# Patient Record
Sex: FEMALE | Race: WHITE | Employment: OTHER | URBAN - METROPOLITAN AREA
[De-identification: names, ages, dates, MRNs, and addresses within clinical notes are randomized per-mention and may not be internally consistent; named-entity substitution may affect disease eponyms.]

---

## 2017-01-16 RX ORDER — ASPIRIN 325 MG
325 TABLET ORAL DAILY
Qty: 30 TABLET | Refills: 5 | Status: SHIPPED | OUTPATIENT
Start: 2017-01-16 | End: 2018-03-02

## 2017-03-21 RX ORDER — ATORVASTATIN CALCIUM 40 MG/1
TABLET, FILM COATED ORAL
Qty: 30 TABLET | Refills: 1 | Status: SHIPPED | OUTPATIENT
Start: 2017-03-21 | End: 2017-06-06 | Stop reason: SDUPTHER

## 2017-04-03 RX ORDER — LOSARTAN POTASSIUM 100 MG/1
TABLET ORAL
Qty: 30 TABLET | Refills: 2 | Status: SHIPPED | OUTPATIENT
Start: 2017-04-03 | End: 2018-02-02 | Stop reason: SDUPTHER

## 2017-04-04 RX ORDER — LOSARTAN POTASSIUM 100 MG/1
TABLET ORAL
Qty: 30 TABLET | Refills: 1 | Status: SHIPPED | OUTPATIENT
Start: 2017-04-04 | End: 2017-08-16 | Stop reason: SDUPTHER

## 2017-06-06 RX ORDER — ATORVASTATIN CALCIUM 40 MG/1
TABLET, FILM COATED ORAL
Qty: 30 TABLET | Refills: 0 | Status: SHIPPED | OUTPATIENT
Start: 2017-06-06 | End: 2017-06-28 | Stop reason: SDUPTHER

## 2017-06-06 RX ORDER — CARVEDILOL 25 MG/1
TABLET ORAL
Qty: 60 TABLET | Refills: 3 | Status: SHIPPED | OUTPATIENT
Start: 2017-06-06 | End: 2018-02-02 | Stop reason: SDUPTHER

## 2017-06-16 ENCOUNTER — TELEPHONE (OUTPATIENT)
Dept: FAMILY MEDICINE CLINIC | Age: 82
End: 2017-06-16

## 2017-06-16 RX ORDER — TRIAMTERENE AND HYDROCHLOROTHIAZIDE 37.5; 25 MG/1; MG/1
TABLET ORAL
Qty: 30 TABLET | Refills: 3 | Status: SHIPPED | OUTPATIENT
Start: 2017-06-16 | End: 2017-08-16 | Stop reason: SDUPTHER

## 2017-06-16 RX ORDER — CARVEDILOL 25 MG/1
TABLET ORAL
Qty: 60 TABLET | Refills: 3 | Status: SHIPPED | OUTPATIENT
Start: 2017-06-16 | End: 2017-08-16 | Stop reason: SDUPTHER

## 2017-06-28 RX ORDER — ATORVASTATIN CALCIUM 40 MG/1
TABLET, FILM COATED ORAL
Qty: 30 TABLET | Refills: 0 | Status: SHIPPED | OUTPATIENT
Start: 2017-06-28 | End: 2017-09-09 | Stop reason: SDUPTHER

## 2017-08-16 ENCOUNTER — OFFICE VISIT (OUTPATIENT)
Dept: FAMILY MEDICINE CLINIC | Age: 82
End: 2017-08-16

## 2017-08-16 VITALS
SYSTOLIC BLOOD PRESSURE: 132 MMHG | HEIGHT: 65 IN | DIASTOLIC BLOOD PRESSURE: 74 MMHG | WEIGHT: 177 LBS | TEMPERATURE: 97.7 F | BODY MASS INDEX: 29.49 KG/M2

## 2017-08-16 DIAGNOSIS — S41.152A DOG BITE OF LEFT UPPER EXTREMITY, INITIAL ENCOUNTER: ICD-10-CM
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
## 2017-08-16 PROCEDURE — 1036F TOBACCO NON-USER: CPT | Performed by: INTERNAL MEDICINE

## 2017-08-16 PROCEDURE — 90715 TDAP VACCINE 7 YRS/> IM: CPT | Performed by: INTERNAL MEDICINE

## 2017-08-16 PROCEDURE — 4040F PNEUMOC VAC/ADMIN/RCVD: CPT | Performed by: INTERNAL MEDICINE

## 2017-08-16 PROCEDURE — 99213 OFFICE O/P EST LOW 20 MIN: CPT | Performed by: INTERNAL MEDICINE

## 2017-08-16 PROCEDURE — 1090F PRES/ABSN URINE INCON ASSESS: CPT | Performed by: INTERNAL MEDICINE

## 2017-08-16 PROCEDURE — G8419 CALC BMI OUT NRM PARAM NOF/U: HCPCS | Performed by: INTERNAL MEDICINE

## 2017-08-16 PROCEDURE — G8427 DOCREV CUR MEDS BY ELIG CLIN: HCPCS | Performed by: INTERNAL MEDICINE

## 2017-08-16 PROCEDURE — 1123F ACP DISCUSS/DSCN MKR DOCD: CPT | Performed by: INTERNAL MEDICINE

## 2017-08-16 PROCEDURE — G8400 PT W/DXA NO RESULTS DOC: HCPCS | Performed by: INTERNAL MEDICINE

## 2017-08-16 PROCEDURE — G8598 ASA/ANTIPLAT THER USED: HCPCS | Performed by: INTERNAL MEDICINE

## 2017-08-16 PROCEDURE — 90471 IMMUNIZATION ADMIN: CPT | Performed by: INTERNAL MEDICINE

## 2017-08-16 RX ORDER — AMOXICILLIN AND CLAVULANATE POTASSIUM 875; 125 MG/1; MG/1
1 TABLET, FILM COATED ORAL 2 TIMES DAILY
Qty: 20 TABLET | Refills: 0 | Status: SHIPPED | OUTPATIENT
Start: 2017-08-16 | End: 2017-08-26

## 2017-08-16 ASSESSMENT — ENCOUNTER SYMPTOMS
APNEA: 0
ABDOMINAL PAIN: 0
SHORTNESS OF BREATH: 0
STRIDOR: 0

## 2017-09-11 RX ORDER — ATORVASTATIN CALCIUM 40 MG/1
TABLET, FILM COATED ORAL
Qty: 30 TABLET | Refills: 0 | Status: ON HOLD | OUTPATIENT
Start: 2017-09-11 | End: 2018-07-26 | Stop reason: ALTCHOICE

## 2017-10-17 RX ORDER — TRIAMTERENE AND HYDROCHLOROTHIAZIDE 37.5; 25 MG/1; MG/1
TABLET ORAL
Qty: 30 TABLET | Refills: 3 | Status: SHIPPED | OUTPATIENT
Start: 2017-10-17 | End: 2017-10-31 | Stop reason: SDUPTHER

## 2017-10-17 RX ORDER — ATORVASTATIN CALCIUM 40 MG/1
TABLET, FILM COATED ORAL
Qty: 30 TABLET | Refills: 0 | Status: SHIPPED | OUTPATIENT
Start: 2017-10-17 | End: 2017-10-31 | Stop reason: SDUPTHER

## 2017-10-30 ENCOUNTER — CARE COORDINATION (OUTPATIENT)
Dept: CARE COORDINATION | Age: 82
End: 2017-10-30

## 2017-10-30 NOTE — CARE COORDINATION
Ambulatory Care Coordination  ED Follow up Call    Reason for ED visit:  Leg Pain/ Varicose Veins   Status:     not changed    Did you call your PCP prior to going to the ED? No      Did you receive a discharge instructions from the Emergency Room? Yes  Review of Instructions:     Understands what to report/when to return?:  No- per pt she doesn't understand \"all the medical talk\"   Understands discharge instructions?:  No- (see above comment)   Following discharge instructions?:  Yes- my children are helping me as much as they can    Are there any new complaints of pain? No- pain in legs hasn't stopped  New Pain Meds? No    Constipation prophylaxis needed? N/A    If you have a wound is the dressing clean, dry, and intact? N/A  Understands wound care regimen? N/A    Are there any other complaints/concerns that you wish to tell your provider? Pt stated she is not doing well and the pain is still there    FU appts/Provider:    Future Appointments  Date Time Provider Tenisha Ortiz   10/31/2017 11:30 AM Hugo GOMEZ MMA     (Assisted with scheduling above appt with PCP- due to pt stating she was just going to go to hospitals to be seen without appt)    New Medications?:   No      Medication Reconciliation by phone - Yes  Understands Medications? No  Taking Medications? Yes  Can you swallow your pills? No    Any further needs in the home i.e. Equipment? No    Link to services in community?:  No   Which services: Will mail [employer] Brochure for pt/pt's family to review. Pt noted to have difficulty with memory during conversation. Pt has no Dx Hx of cognitive impairment. Per notes it states pt needs constant redirection (ER note from 10/23).      Tamika MOSS, RN Care Coordinator  [address] [place] [address],  [address] [place] [address] Primary Care  [phone]

## 2017-10-30 NOTE — LETTER
10/30/2017    Barbara Delacruz  [address] [facility] [address]    I am following up on my previous contact. I wanted to introduce myself and the care coordination program offered here at [facility] DO CHRISTAL's office. Our goal is to support you in your efforts to be as healthy as possible. [address] [place] [address] is committed to walk with you on your journey to better health. Please let me know if you have any questions or if you would like to schedule an appointment with me. You can reach me at the numbers listed below. I am enclosing a [place] [employer] Brochure for you and your family to review for possible community resources. If you have any questions regarding their services and your eligibility please call them directly. I look forward to speaking with you in the next 2 weeks.      In good health,     Analia AUSTINN, RN Care Coordinator  [address] [place] [address],  [address] [place] [address] Primary Care  [phone]

## 2017-10-31 ENCOUNTER — OFFICE VISIT (OUTPATIENT)
Dept: FAMILY MEDICINE CLINIC | Age: 82
End: 2017-10-31

## 2017-10-31 VITALS
BODY MASS INDEX: 27.66 KG/M2 | TEMPERATURE: 98.2 F | WEIGHT: 166 LBS | HEIGHT: 65 IN | DIASTOLIC BLOOD PRESSURE: 70 MMHG | SYSTOLIC BLOOD PRESSURE: 130 MMHG

## 2017-10-31 DIAGNOSIS — I10 ESSENTIAL HYPERTENSION: Primary | ICD-10-CM

## 2017-10-31 DIAGNOSIS — E78.00 HYPERCHOLESTEREMIA: ICD-10-CM

## 2017-10-31 DIAGNOSIS — K59.1 FUNCTIONAL DIARRHEA: ICD-10-CM

## 2017-10-31 DIAGNOSIS — E03.9 ACQUIRED HYPOTHYROIDISM: ICD-10-CM

## 2017-10-31 DIAGNOSIS — I80.02 PHLEBITIS AND THOMBOPHLB OF SUPERFIC VESSELS OF L LOW EXTREM: ICD-10-CM

## 2017-10-31 PROCEDURE — G8484 FLU IMMUNIZE NO ADMIN: HCPCS | Performed by: INTERNAL MEDICINE

## 2017-10-31 PROCEDURE — G8598 ASA/ANTIPLAT THER USED: HCPCS | Performed by: INTERNAL MEDICINE

## 2017-10-31 PROCEDURE — 1090F PRES/ABSN URINE INCON ASSESS: CPT | Performed by: INTERNAL MEDICINE

## 2017-10-31 PROCEDURE — 4040F PNEUMOC VAC/ADMIN/RCVD: CPT | Performed by: INTERNAL MEDICINE

## 2017-10-31 PROCEDURE — G8427 DOCREV CUR MEDS BY ELIG CLIN: HCPCS | Performed by: INTERNAL MEDICINE

## 2017-10-31 PROCEDURE — 99214 OFFICE O/P EST MOD 30 MIN: CPT | Performed by: INTERNAL MEDICINE

## 2017-10-31 PROCEDURE — 1123F ACP DISCUSS/DSCN MKR DOCD: CPT | Performed by: INTERNAL MEDICINE

## 2017-10-31 PROCEDURE — G8400 PT W/DXA NO RESULTS DOC: HCPCS | Performed by: INTERNAL MEDICINE

## 2017-10-31 PROCEDURE — 1036F TOBACCO NON-USER: CPT | Performed by: INTERNAL MEDICINE

## 2017-10-31 PROCEDURE — G8419 CALC BMI OUT NRM PARAM NOF/U: HCPCS | Performed by: INTERNAL MEDICINE

## 2017-10-31 ASSESSMENT — ENCOUNTER SYMPTOMS
COUGH: 0
DIARRHEA: 1
WHEEZING: 0
SINUS PAIN: 0
SINUS PRESSURE: 0
RHINORRHEA: 0
APNEA: 0
SORE THROAT: 0
SHORTNESS OF BREATH: 0

## 2017-10-31 NOTE — PATIENT INSTRUCTIONS
Thank you for choosing [facility]. Please bring a current list of medications to every appointment. Please contact your pharmacy for any prescription refill(s) that you are requesting.

## 2017-10-31 NOTE — PROGRESS NOTES
diarrhea  Garett Collier MD (ROBBY)   3. Phlebitis and thombophlb of superfic vessels of l low extrem     4. Hypercholesteremia  Lipid Panel    AST    ALT   5. Acquired hypothyroidism  TSH without Reflex         Plan:      Outpatient Encounter Prescriptions as of 10/31/2017   Medication Sig Dispense Refill    hydrocortisone-pramoxine (PROCTOFOAM HC) 1-1 % rectal foam Place rectally 2 times daily. 1 Can 0    HYDROcodone-acetaminophen (NORCO) 7.5-325 MG per tablet Take 0.5 tablets by mouth every 6 hours as needed for Pain . 10 tablet 0    atorvastatin (LIPITOR) 40 MG tablet TAKE ONE TABLET BY MOUTH EVERY EVENING 30 tablet 0    carvedilol (COREG) 25 MG tablet TAKE ONE TABLET BY MOUTH TWICE A DAY 60 tablet 3    losartan (COZAAR) 100 MG tablet TAKE ONE TABLET BY MOUTH DAILY 30 tablet 2    aspirin (CASI ASPIRIN) 325 MG tablet Take 1 tablet by mouth daily 30 tablet 5    levothyroxine (SYNTHROID) 50 MCG tablet TAKE ONE TABLET BY MOUTH DAILY 90 tablet 0    triamterene-hydrochlorothiazide (MAXZIDE-25) 37.5-25 MG per tablet TAKE ONE TABLET BY MOUTH DAILY 30 tablet 4    NEXIUM 40 MG capsule TAKE ONE CAPSULE BY MOUTH DAILY BEFORE BREAKFAST 90 capsule 2    diclofenac sodium (VOLTAREN) 1 % GEL APPLY 2 GRAMS APPLY TO AFFECTED AREA(S) TWO TIMES A DAY 1 Tube 12    [DISCONTINUED] atorvastatin (LIPITOR) 40 MG tablet TAKE ONE TABLET BY MOUTH EVERY EVENING 30 tablet 0    [DISCONTINUED] triamterene-hydrochlorothiazide (MAXZIDE-25) 37.5-25 MG per tablet TAKE ONE TABLET BY MOUTH DAILY 30 tablet 3    polyethylene glycol (MIRALAX) powder Take 17 g by mouth daily. No facility-administered encounter medications on file as of 10/31/2017.       Orders Placed This Encounter   Procedures    Lipid Panel     Standing Status:   Future     Standing Expiration Date:   10/31/2018     Order Specific Question:   Is Patient Fasting?/# of Hours     Answer:   12    AST     Standing Status:   Future Standing Expiration Date:   10/31/2018    ALT     Standing Status:   Future     Standing Expiration Date:   10/31/2018    Basic Metabolic Panel     Standing Status:   Future     Standing Expiration Date:   10/31/2018    TSH without Reflex     Standing Status:   Future     Standing Expiration Date:   10/31/2018   Loly Brasher MD ([facility])     Referral Priority:   Routine     Referral Type:   Consult for Advice and Opinion     Referral Reason:   Specialty Services Required     Referred to Provider:   Jaleesa Torres MD     Requested Specialty:   Gastroenterology     Number of Visits Requested:   1    refer to gi

## 2017-11-01 ENCOUNTER — TELEPHONE (OUTPATIENT)
Dept: FAMILY MEDICINE CLINIC | Age: 82
End: 2017-11-01

## 2017-11-07 ENCOUNTER — CARE COORDINATION (OUTPATIENT)
Dept: CARE COORDINATION | Age: 82
End: 2017-11-07

## 2017-11-14 NOTE — CARE COORDINATION
Third outreach call attempted. Pt declined stated she has no needs people can help her with. No further needs at this time.     José Miguel Amezcua BSN, RN Care Coordinator  [address] [place] [address] Primary Care  [phone]

## 2017-12-06 ENCOUNTER — HOSPITAL ENCOUNTER (OUTPATIENT)
Dept: NON INVASIVE DIAGNOSTICS | Age: 82
Discharge: HOME | End: 2017-12-06
Attending: INTERNAL MEDICINE | Admitting: INTERNAL MEDICINE

## 2017-12-06 DIAGNOSIS — K56.41 FECAL IMPACTION (HCC): ICD-10-CM

## 2017-12-07 ENCOUNTER — HOSPITAL ENCOUNTER (OUTPATIENT)
Dept: ENDOSCOPY | Age: 82
Discharge: HOME | End: 2017-12-07
Attending: INTERNAL MEDICINE | Admitting: INTERNAL MEDICINE

## 2017-12-07 VITALS
BODY MASS INDEX: 28.34 KG/M2 | HEIGHT: 64 IN | HEART RATE: 58 BPM | WEIGHT: 166 LBS | SYSTOLIC BLOOD PRESSURE: 176 MMHG | OXYGEN SATURATION: 100 % | TEMPERATURE: 97 F | DIASTOLIC BLOOD PRESSURE: 62 MMHG | RESPIRATION RATE: 16 BRPM

## 2017-12-07 PROCEDURE — 93010 ELECTROCARDIOGRAM REPORT: CPT | Performed by: INTERNAL MEDICINE

## 2017-12-07 RX ORDER — SODIUM CHLORIDE 0.9 % (FLUSH) 0.9 %
10 SYRINGE (ML) INJECTION EVERY 12 HOURS SCHEDULED
Status: DISCONTINUED | OUTPATIENT
Start: 2017-12-07 | End: 2017-12-08 | Stop reason: HOSPADM

## 2017-12-07 RX ORDER — SODIUM CHLORIDE 0.9 % (FLUSH) 0.9 %
10 SYRINGE (ML) INJECTION PRN
Status: DISCONTINUED | OUTPATIENT
Start: 2017-12-07 | End: 2017-12-08 | Stop reason: HOSPADM

## 2017-12-07 RX ORDER — SODIUM CHLORIDE 9 MG/ML
INJECTION, SOLUTION INTRAVENOUS CONTINUOUS
Status: DISCONTINUED | OUTPATIENT
Start: 2017-12-07 | End: 2017-12-08 | Stop reason: HOSPADM

## 2017-12-07 RX ADMIN — SODIUM CHLORIDE: 9 INJECTION, SOLUTION INTRAVENOUS at 12:05

## 2017-12-07 ASSESSMENT — LIFESTYLE VARIABLES: SMOKING_STATUS: 0

## 2017-12-07 ASSESSMENT — PAIN - FUNCTIONAL ASSESSMENT: PAIN_FUNCTIONAL_ASSESSMENT: FLACC

## 2017-12-07 ASSESSMENT — PAIN SCALES - GENERAL
PAINLEVEL_OUTOF10: 0
PAINLEVEL_OUTOF10: 0

## 2017-12-07 ASSESSMENT — ENCOUNTER SYMPTOMS: SHORTNESS OF BREATH: 0

## 2017-12-07 NOTE — H&P
[facility] [place]   Pre-operative History and Physical    Patient: Nina Morales  : 1935  Acct#:     HISTORY OF PRESENT ILLNESS:    The patient is a 81 y.o. female who presents with fecal impaction. Too uncomfortable to disimpact in the office. Plan is for flex sig with disimpaction. Past Medical History:        Diagnosis Date    GERD (gastroesophageal reflux disease)     Hyperlipidemia     Hypertension     Hypothyroidism     Osteoarthritis       Past Surgical History:        Procedure Laterality Date    CATARACT REMOVAL  2008    right    CHOLECYSTECTOMY      COLONOSCOPY  2011    Dr. Leon Gordon , severe sigmoid diverticulosis, friable internal hemorrhoids, scarred anal sphincter    HYSTERECTOMY      JOINT REPLACEMENT      [place] TOTAL KNEE ARTHROPLASTY  2011    DR. Bettencourt -right Knee Replacement, Total    JASMEET AND BSO      TOTAL HIP ARTHROPLASTY      left/right    TOTAL KNEE ARTHROPLASTY      left    TOTAL KNEE ARTHROPLASTY      right    UPPER GASTROINTESTINAL ENDOSCOPY  02  -Dr. Springer    small sliding hiatal hernia/gastritis      Medications Prior to Admission:   Current Outpatient Prescriptions on File Prior to Encounter   Medication Sig Dispense Refill    carvedilol (COREG) 25 MG tablet TAKE ONE TABLET BY MOUTH TWICE A DAY 60 tablet 3    hydrocortisone-pramoxine (PROCTOFOAM HC) 1-1 % rectal foam Place rectally 2 times daily.  1 Can 0    atorvastatin (LIPITOR) 40 MG tablet TAKE ONE TABLET BY MOUTH EVERY EVENING 30 tablet 0    losartan (COZAAR) 100 MG tablet TAKE ONE TABLET BY MOUTH DAILY 30 tablet 2    aspirin (CASI ASPIRIN) 325 MG tablet Take 1 tablet by mouth daily 30 tablet 5    levothyroxine (SYNTHROID) 50 MCG tablet TAKE ONE TABLET BY MOUTH DAILY 90 tablet 0    triamterene-hydrochlorothiazide (MAXZIDE-25) 37.5-25 MG per tablet TAKE ONE TABLET BY MOUTH DAILY 30 tablet 4    NEXIUM 40 MG capsule TAKE ONE CAPSULE BY MOUTH DAILY

## 2017-12-07 NOTE — PROGRESS NOTES
1. Identify name and date of birth. 2.  Patient remains free from signs and symptoms of injury. 3.  Patient receives appropriate medication(s), safely administered during the       Perioperative period. 4.  The patient is free from signs and symptoms of infection. 5.  The patient has wound/tissur perfusion. 6.  The patient's fluid, electrolyte, and acid-base balances are established perioperatively. 7.  The patient's pulmonary function is established preoperatively. 8.  The patient's cardiovascular status is established perioperatively. 9.  The patient/caregiver demonstrates knowledge of nutritional management related to the operative or other invasive procedure. 10. The patient/caregiver demonstrates knowledge of medication management. 11. The patient/caregiver demonstrates knowledge of pain management. 12.  The patient participates in the rehabilitation process as applicable. 13.  The patient/caregiver participates in decisions in decisions affecting his or her Perioperative plan of care. 14.  The patients care is consistent with the individualized Perioperative plan of care. 15.  The patients right to privacy is maintained. 16.  The patient is the recipient of competent and ethical care within legal standards of practice. 17.  The patient's value system, lifestyle, ethnicity, and culture are considered, respected, and incorporated int the perioperative plan of care and understands special services available. 18.  The patient demonstrates and/or reports adequate pain control throughout the perioperative period. 19. The patient's neurological status is established preoperatively. 20. The patient/caregiver demonstrates knowledge of the expected responses to the operative or invasive procedure. 21.  Patient/caregiver has reduced anxiety. Interventions - Familiarize with environment and equipment. 22.  Patient/caregiver verbalizes understanding of Phase I and Phase II process.   23.  Patient pain level is established preoperatively using age appropriate pain scale.   Electronically signed by Xavier Keith RN on 12/7/2017 at 11:32 AM

## 2017-12-07 NOTE — ANESTHESIA PRE-OP
[facility] Department of Anesthesiology  Pre-Anesthesia Evaluation/Consultation       Name:  Champ Hunter  : 1935  Age:  81 y.o. MRN:  [id number]  Date: 2017           Procedure (Scheduled):  colonoscopy  Surgeon:  Dr. John Darling Other      Plastic tape       Patient Active Problem List   Diagnosis    Hypertension    Hypercholesteremia    Hypothyroidism    Uterine prolapse    Degenerative joint disease    Varicose veins of both lower extremities    Reflux esophagitis    Personal history of lymphoma    Other specified forms of hearing loss    LBBB (left bundle branch block)    Reactive depression (situational)    Preventative health care    Syncope    CAD (coronary artery disease)    Osteopenia    Left arm pain    Pain of left heel    Sinusitis    Back pain    Acute frontal sinusitis    Memory loss    Dog bite of left upper extremity    Functional diarrhea    Phlebitis and thombophlb of superfic vessels of l low extrem     Past Medical History:   Diagnosis Date    GERD (gastroesophageal reflux disease)     Hyperlipidemia     Hypertension     Hypothyroidism     Osteoarthritis      Past Surgical History:   Procedure Laterality Date    CATARACT REMOVAL  2008    right    CHOLECYSTECTOMY      COLONOSCOPY  2011    Dr. Pam Burk , severe sigmoid diverticulosis, friable internal hemorrhoids, scarred anal sphincter    HYSTERECTOMY      JOINT REPLACEMENT      [place] TOTAL KNEE ARTHROPLASTY  2011    DR. Bettencourt -right Knee Replacement, Total    JASMEET AND BSO      TOTAL HIP ARTHROPLASTY      left/right    TOTAL KNEE ARTHROPLASTY      left    TOTAL KNEE ARTHROPLASTY  2004    right    UPPER GASTROINTESTINAL ENDOSCOPY  02  -Dr. Caryle Shields    small sliding hiatal hernia/gastritis     Social History   Substance Use Topics    Smoking status: Never Smoker    Smokeless

## 2017-12-07 NOTE — PROGRESS NOTES
Alert and oriented. abd soft. Vss. No c/o. Tolerated sitting up and po fluids well. Family at bedside. Understands discharge instructions.

## 2017-12-07 NOTE — OP NOTE
colon.  Would then use linzess to maintain good bowel movements.     Harris Gtaes MD   [address]  12/7/2017

## 2017-12-11 LAB
EKG ATRIAL RATE: 62 BPM
EKG DIAGNOSIS: NORMAL
EKG P AXIS: 48 DEGREES
EKG P-R INTERVAL: 138 MS
EKG Q-T INTERVAL: 440 MS
EKG QRS DURATION: 130 MS
EKG QTC CALCULATION (BAZETT): 446 MS
EKG R AXIS: 10 DEGREES
EKG T AXIS: 85 DEGREES
EKG VENTRICULAR RATE: 62 BPM

## 2018-01-05 RX ORDER — ATORVASTATIN CALCIUM 40 MG/1
TABLET, FILM COATED ORAL
Qty: 30 TABLET | Refills: 0 | Status: SHIPPED | OUTPATIENT
Start: 2018-01-05 | End: 2018-02-26 | Stop reason: SDUPTHER

## 2018-02-02 RX ORDER — LOSARTAN POTASSIUM 100 MG/1
TABLET ORAL
Qty: 90 TABLET | Refills: 0 | Status: SHIPPED | OUTPATIENT
Start: 2018-02-02 | End: 2018-08-17 | Stop reason: SDUPTHER

## 2018-02-02 RX ORDER — ESOMEPRAZOLE MAGNESIUM 40 MG/1
CAPSULE, DELAYED RELEASE ORAL
Qty: 90 CAPSULE | Refills: 0 | Status: SHIPPED | OUTPATIENT
Start: 2018-02-02 | End: 2018-05-03 | Stop reason: SDUPTHER

## 2018-02-02 RX ORDER — CARVEDILOL 25 MG/1
TABLET ORAL
Qty: 60 TABLET | Refills: 2 | Status: SHIPPED | OUTPATIENT
Start: 2018-02-02 | End: 2018-08-06 | Stop reason: SDUPTHER

## 2018-02-26 RX ORDER — ATORVASTATIN CALCIUM 40 MG/1
TABLET, FILM COATED ORAL
Qty: 30 TABLET | Refills: 3 | Status: SHIPPED | OUTPATIENT
Start: 2018-02-26 | End: 2018-03-02 | Stop reason: SDUPTHER

## 2018-03-02 ENCOUNTER — OFFICE VISIT (OUTPATIENT)
Dept: FAMILY MEDICINE CLINIC | Age: 83
End: 2018-03-02

## 2018-03-02 VITALS
HEIGHT: 64 IN | BODY MASS INDEX: 30.29 KG/M2 | SYSTOLIC BLOOD PRESSURE: 128 MMHG | DIASTOLIC BLOOD PRESSURE: 78 MMHG | WEIGHT: 177.4 LBS

## 2018-03-02 DIAGNOSIS — I10 ESSENTIAL HYPERTENSION: ICD-10-CM

## 2018-03-02 DIAGNOSIS — E78.00 HYPERCHOLESTEREMIA: ICD-10-CM

## 2018-03-02 DIAGNOSIS — I87.2 VENOUS INSUFFICIENCY: ICD-10-CM

## 2018-03-02 DIAGNOSIS — E03.9 ACQUIRED HYPOTHYROIDISM: ICD-10-CM

## 2018-03-02 DIAGNOSIS — E03.9 ACQUIRED HYPOTHYROIDISM: Primary | ICD-10-CM

## 2018-03-02 LAB
ALT SERPL-CCNC: 15 U/L (ref 10–40)
ANION GAP SERPL CALCULATED.3IONS-SCNC: 11 MMOL/L (ref 3–16)
AST SERPL-CCNC: 16 U/L (ref 15–37)
BUN BLDV-MCNC: 12 MG/DL (ref 7–20)
CALCIUM SERPL-MCNC: 10.2 MG/DL (ref 8.3–10.6)
CHLORIDE BLD-SCNC: 100 MMOL/L (ref 99–110)
CHOLESTEROL, TOTAL: 209 MG/DL (ref 0–199)
CO2: 29 MMOL/L (ref 21–32)
CREAT SERPL-MCNC: 0.8 MG/DL (ref 0.6–1.2)
GFR AFRICAN AMERICAN: >60
GFR NON-AFRICAN AMERICAN: >60
GLUCOSE BLD-MCNC: 114 MG/DL (ref 70–99)
HDLC SERPL-MCNC: 45 MG/DL (ref 40–60)
LDL CHOLESTEROL CALCULATED: 122 MG/DL
POTASSIUM SERPL-SCNC: 4.7 MMOL/L (ref 3.5–5.1)
SODIUM BLD-SCNC: 140 MMOL/L (ref 136–145)
TRIGL SERPL-MCNC: 210 MG/DL (ref 0–150)
TSH SERPL DL<=0.05 MIU/L-ACNC: 9.63 UIU/ML (ref 0.27–4.2)
VLDLC SERPL CALC-MCNC: 42 MG/DL

## 2018-03-02 PROCEDURE — G8598 ASA/ANTIPLAT THER USED: HCPCS | Performed by: INTERNAL MEDICINE

## 2018-03-02 PROCEDURE — G8484 FLU IMMUNIZE NO ADMIN: HCPCS | Performed by: INTERNAL MEDICINE

## 2018-03-02 PROCEDURE — 99214 OFFICE O/P EST MOD 30 MIN: CPT | Performed by: INTERNAL MEDICINE

## 2018-03-02 PROCEDURE — 1090F PRES/ABSN URINE INCON ASSESS: CPT | Performed by: INTERNAL MEDICINE

## 2018-03-02 PROCEDURE — G8427 DOCREV CUR MEDS BY ELIG CLIN: HCPCS | Performed by: INTERNAL MEDICINE

## 2018-03-02 PROCEDURE — 1123F ACP DISCUSS/DSCN MKR DOCD: CPT | Performed by: INTERNAL MEDICINE

## 2018-03-02 PROCEDURE — G8417 CALC BMI ABV UP PARAM F/U: HCPCS | Performed by: INTERNAL MEDICINE

## 2018-03-02 PROCEDURE — 4040F PNEUMOC VAC/ADMIN/RCVD: CPT | Performed by: INTERNAL MEDICINE

## 2018-03-02 PROCEDURE — 1036F TOBACCO NON-USER: CPT | Performed by: INTERNAL MEDICINE

## 2018-03-02 PROCEDURE — G8400 PT W/DXA NO RESULTS DOC: HCPCS | Performed by: INTERNAL MEDICINE

## 2018-03-02 RX ORDER — LEVOTHYROXINE SODIUM 0.05 MG/1
TABLET ORAL
Qty: 90 TABLET | Refills: 0 | Status: SHIPPED | OUTPATIENT
Start: 2018-03-02 | End: 2018-05-03 | Stop reason: SDUPTHER

## 2018-03-02 ASSESSMENT — PATIENT HEALTH QUESTIONNAIRE - PHQ9
SUM OF ALL RESPONSES TO PHQ9 QUESTIONS 1 & 2: 0
2. FEELING DOWN, DEPRESSED OR HOPELESS: 0
SUM OF ALL RESPONSES TO PHQ QUESTIONS 1-9: 0
1. LITTLE INTEREST OR PLEASURE IN DOING THINGS: 0

## 2018-03-02 ASSESSMENT — ENCOUNTER SYMPTOMS
DIARRHEA: 0
SHORTNESS OF BREATH: 0
APNEA: 0
ABDOMINAL DISTENTION: 0
SINUS PAIN: 0
CONSTIPATION: 0
BLOOD IN STOOL: 0
ABDOMINAL PAIN: 0
NAUSEA: 0
SINUS PRESSURE: 0
RHINORRHEA: 0

## 2018-03-02 NOTE — PROGRESS NOTES
Subjective:      Patient ID: Chela Hollins is a 81 y.o. female. HPI   Chief Complaint   Patient presents with    Swelling     patient c/o left foot swelling x 2-3 days. patient denies injury.     Other     patient's family request to discuss to advise patient that she should Not drive any longer    Skin Problem     patient request \"spot\" on right leg and behind left ear     Cehla Hollins is a 81 y.o. female with the following history as recorded in [facility]:  Patient Active Problem List    Diagnosis Date Noted    Syncope 09/04/2012     Priority: High    CAD (coronary artery disease) 09/04/2012     Priority: High    Hypertension 05/17/2010     Priority: High    Hypercholesteremia 05/17/2010     Priority: High    Hypothyroidism 05/17/2010     Priority: High    Uterine prolapse 05/17/2010     Priority: Medium    Degenerative joint disease 05/17/2010     Priority: Medium    Reflux esophagitis 05/17/2010     Priority: Medium    Personal history of lymphoma 05/17/2010     Priority: Medium    Preventative health care 08/18/2012     Priority: Low    Reactive depression (situational) 05/09/2012     Priority: Low    LBBB (left bundle branch block) 03/22/2011     Priority: Low    Varicose veins of both lower extremities 05/17/2010     Priority: Low    Other specified forms of hearing loss 05/17/2010     Priority: Low    Functional diarrhea 10/31/2017    Phlebitis and thombophlb of superfic vessels of l low extrem 10/31/2017    Dog bite of left upper extremity 08/16/2017    Memory loss 09/13/2016    Acute frontal sinusitis 05/04/2016    Back pain 03/10/2015    Sinusitis 11/26/2014    Pain of left heel 10/09/2013    Left arm pain 03/14/2013    Osteopenia 10/10/2012     Current Outpatient Prescriptions   Medication Sig Dispense Refill    esomeprazole (NEXIUM) 40 MG delayed release capsule TAKE ONE CAPSULE BY MOUTH DAILY BEFORE BREAKFAST 90 capsule 0    losartan (COZAAR) 100 MG tablet TAKE ONE

## 2018-03-02 NOTE — PATIENT INSTRUCTIONS
Thank you for choosing [facility]. Please bring a current list of medications to every appointment. Please contact your pharmacy for any prescription refill(s) that you are requesting.

## 2018-03-07 ENCOUNTER — TELEPHONE (OUTPATIENT)
Dept: FAMILY MEDICINE CLINIC | Age: 83
End: 2018-03-07

## 2018-03-07 DIAGNOSIS — E03.9 ACQUIRED HYPOTHYROIDISM: Primary | ICD-10-CM

## 2018-03-07 NOTE — TELEPHONE ENCOUNTER
Jens advised and states that patient has Not been taking Synthroid as directed. Patient has resumed her Synthroid after her office visit and will have TSH recheck in 8 weeks.

## 2018-03-07 NOTE — TELEPHONE ENCOUNTER
Thyroid is low increase synthroid to . 075 mg # 30 1 po qd 3 refills recheck tsh in 8 weeks .  Other labs were ok

## 2018-05-04 RX ORDER — LEVOTHYROXINE SODIUM 0.05 MG/1
TABLET ORAL
Qty: 90 TABLET | Refills: 0 | Status: ON HOLD | OUTPATIENT
Start: 2018-05-04 | End: 2018-07-28 | Stop reason: HOSPADM

## 2018-05-04 RX ORDER — ESOMEPRAZOLE MAGNESIUM 40 MG/1
CAPSULE, DELAYED RELEASE ORAL
Qty: 90 CAPSULE | Refills: 0 | Status: SHIPPED | OUTPATIENT
Start: 2018-05-04 | End: 2019-11-13

## 2018-06-25 RX ORDER — TRIAMTERENE AND HYDROCHLOROTHIAZIDE 37.5; 25 MG/1; MG/1
TABLET ORAL
Qty: 30 TABLET | Refills: 2 | Status: SHIPPED | OUTPATIENT
Start: 2018-06-25 | End: 2018-07-25 | Stop reason: SDUPTHER

## 2018-07-12 ENCOUNTER — CARE COORDINATION (OUTPATIENT)
Dept: CASE MANAGEMENT | Age: 83
End: 2018-07-12

## 2018-07-12 NOTE — CARE COORDINATION
[facility] Transitions Initial Follow Up Call    Call within 2 business days of discharge: Yes    Patient: Carlyle Pelaez Patient : 1935   MRN: <[id number]>  Reason for Admission: There are no discharge diagnoses documented for the most recent discharge. Discharge Date: 18 RARS: Readmission Risk Score: 5     Spoke with: Attempted to contact patient for transition call. Unable to reach, unable to leave a VM. Home number D/C, mobile number not accepting calls. Will attempt to outreach at a later date. Facility: [address] [place] [address] Transitions 24 Hour Call    Care Transitions Interventions         Follow Up  No future appointments.     Branden Wang RN

## 2018-07-16 NOTE — CARE COORDINATION
[facility] Transitions Initial Follow Up Call    Call within 2 business days of discharge: Yes    Patient: Whit Dawson Patient : 1935   MRN: [id number]  Reason for Admission: There are no discharge diagnoses documented for the most recent discharge. Discharge Date: 18 RARS: Readmission Risk Score: 5         Facility: [facility] Transitions 24 Hour Call    Care Transitions Interventions       Third and final attempt to contact Pt for initial transitions call. Contact information left to alternate VM requesting call back at the earliest convenience. Home phone invalid, mobile number not accepting calls. Amy Keating RN BSN   Care Transitions Coordinator  [phone]         Follow Up  No future appointments.     Amy Keating RN

## 2018-07-17 ENCOUNTER — CARE COORDINATION (OUTPATIENT)
Dept: CASE MANAGEMENT | Age: 83
End: 2018-07-17

## 2018-07-19 NOTE — CARE COORDINATION
[facility] Transitions Follow Up Call    2018    Patient: Aj Levy  Patient : 1935   MRN: [id number]  Reason for Admission: There are no discharge diagnoses documented for the most recent discharge. Discharge Date: 18 RARS: Readmission Risk Score: 5           Care Transitions Subsequent and Final Call    Subsequent and Final Calls  Care Transitions Interventions  Other Interventions:          Attempted to contact pt's son to follow up on transitions to home. No answer, will continue to attempt to reach    Daniella Candelario, [address] [place] [address] Coordinator  [phone]     Follow Up  No future appointments.     Daniella Candelario RN

## 2018-07-23 ENCOUNTER — TELEPHONE (OUTPATIENT)
Dept: FAMILY MEDICINE CLINIC | Age: 83
End: 2018-07-23

## 2018-07-23 NOTE — TELEPHONE ENCOUNTER
I dont think that she needs hospice at this time if she is sick and weak she needs to be seen at the ed to day .  I also ordered a home health care evaluation

## 2018-07-23 NOTE — TELEPHONE ENCOUNTER
Sallie Dejesus w/ hospice called she stated the pt isn't signed on with them. We need to call the family.

## 2018-07-23 NOTE — TELEPHONE ENCOUNTER
Pt daughter calling to asking for order for [facility]  to evaluate patient  for home care. She states pt is very weak not able to walk and not eating/drinking. She is asking for order to be faxed to [address] [address]. Phone number is [phone].     Please adviseJacoby [phone]

## 2018-07-23 NOTE — TELEPHONE ENCOUNTER
Patient's sister advised and states that they (the family) are not taking the patient to the ER as directed at this time.

## 2018-07-25 ENCOUNTER — OFFICE VISIT (OUTPATIENT)
Dept: FAMILY MEDICINE CLINIC | Age: 83
End: 2018-07-25

## 2018-07-25 VITALS
DIASTOLIC BLOOD PRESSURE: 38 MMHG | BODY MASS INDEX: 24.01 KG/M2 | SYSTOLIC BLOOD PRESSURE: 60 MMHG | WEIGHT: 153 LBS | HEIGHT: 67 IN

## 2018-07-25 DIAGNOSIS — I95.9: Primary | ICD-10-CM

## 2018-07-25 DIAGNOSIS — E86.0 DEHYDRATION: ICD-10-CM

## 2018-07-25 DIAGNOSIS — R41.3 MEMORY LOSS: ICD-10-CM

## 2018-07-25 PROBLEM — R10.84 GENERALIZED ABDOMINAL PAIN: Status: ACTIVE | Noted: 2018-07-25

## 2018-07-25 PROBLEM — F03.90 DEMENTIA (HCC): Status: ACTIVE | Noted: 2018-07-25

## 2018-07-25 PROBLEM — K52.9 COLITIS: Status: ACTIVE | Noted: 2018-07-25

## 2018-07-25 PROBLEM — K59.00 CONSTIPATION: Status: ACTIVE | Noted: 2018-07-25

## 2018-07-25 PROCEDURE — 1090F PRES/ABSN URINE INCON ASSESS: CPT | Performed by: INTERNAL MEDICINE

## 2018-07-25 PROCEDURE — G8420 CALC BMI NORM PARAMETERS: HCPCS | Performed by: INTERNAL MEDICINE

## 2018-07-25 PROCEDURE — 1101F PT FALLS ASSESS-DOCD LE1/YR: CPT | Performed by: INTERNAL MEDICINE

## 2018-07-25 PROCEDURE — 1123F ACP DISCUSS/DSCN MKR DOCD: CPT | Performed by: INTERNAL MEDICINE

## 2018-07-25 PROCEDURE — 4040F PNEUMOC VAC/ADMIN/RCVD: CPT | Performed by: INTERNAL MEDICINE

## 2018-07-25 PROCEDURE — 99213 OFFICE O/P EST LOW 20 MIN: CPT | Performed by: INTERNAL MEDICINE

## 2018-07-25 PROCEDURE — G8427 DOCREV CUR MEDS BY ELIG CLIN: HCPCS | Performed by: INTERNAL MEDICINE

## 2018-07-25 PROCEDURE — 1036F TOBACCO NON-USER: CPT | Performed by: INTERNAL MEDICINE

## 2018-07-25 PROCEDURE — G8598 ASA/ANTIPLAT THER USED: HCPCS | Performed by: INTERNAL MEDICINE

## 2018-07-25 PROCEDURE — G8400 PT W/DXA NO RESULTS DOC: HCPCS | Performed by: INTERNAL MEDICINE

## 2018-07-25 ASSESSMENT — ENCOUNTER SYMPTOMS
WHEEZING: 0
COUGH: 0
RECTAL PAIN: 1
APNEA: 0
VOMITING: 0
DIARRHEA: 0
BLOOD IN STOOL: 0
ABDOMINAL PAIN: 0
RHINORRHEA: 0
SHORTNESS OF BREATH: 0

## 2018-07-25 NOTE — PROGRESS NOTES
Subjective:      Patient ID: Clive Razo is a 81 y.o. female. HPI   Chief Complaint   Patient presents with   Jerome Ohara Other     discuss decline in health. please review medication list (patient has not been taking all meds listed)   patient family were instructed tto take here to  The ED on Monday but refused . She has had increased dizziness and was not eating much  now her BP is 62/42. She has been in the ed multiple time over the past month . Has had significant issues with bowel impaction . Review of Systems   Constitutional: Positive for fatigue. Negative for chills, diaphoresis and fever. HENT: Negative for congestion, postnasal drip and rhinorrhea. Respiratory: Negative for apnea, cough, shortness of breath and wheezing. Cardiovascular: Negative for chest pain and palpitations. Gastrointestinal: Positive for rectal pain. Negative for abdominal pain, blood in stool, diarrhea and vomiting. Genitourinary: Negative for dysuria and hematuria. Musculoskeletal: Negative for arthralgias and myalgias. Skin: Negative for rash. Neurological: Positive for dizziness and light-headedness. Negative for tremors. Hematological: Does not bruise/bleed easily. Objective:   Physical Exam   Constitutional: She appears distressed. HENT:   Head: Normocephalic and atraumatic. Eyes: Conjunctivae are normal. Pupils are equal, round, and reactive to light. Neck: No thyromegaly present. Cardiovascular: Normal rate. No murmur heard. Pulmonary/Chest: Effort normal and breath sounds normal.   Abdominal: She exhibits distension. She exhibits no mass. There is tenderness. There is no rebound and no guarding. Neurological: She is alert. Assessment:        Diagnosis Orders   1. Significant hypotension     2. Dehydration     3.  Memory loss             Plan:      Send to ER

## 2018-07-29 ENCOUNTER — CARE COORDINATION (OUTPATIENT)
Dept: CASE MANAGEMENT | Age: 83
End: 2018-07-29

## 2018-07-30 NOTE — CARE COORDINATION
[facility] Transitions Initial Follow Up Call    Call within 2 business days of discharge: Yes    Patient: Gay Baumgarten Patient : 1935   MRN: [id number]  Reason for Admission: There are no discharge diagnoses documented for the most recent discharge. Discharge Date: 18 RARS: Readmission Risk Score: 16     Spoke with: Kya Love - son    Facility: [facility]    Non-face-to-face services provided:  Obtained and reviewed discharge summary and/or continuity of care documents    Care Transitions 24 Hour Call    Do you have any ongoing symptoms?:  No  Do you have a copy of your discharge instructions?:  Yes  Do you have all of your prescriptions and are they filled?:  Yes  Have you been contacted by a [address]?:  No  Have you scheduled your follow up appointment?:  No  Were you discharged with any Home Care or Post Acute Services:  Yes  Post Acute Services:  Home Health (Comment: [facility])  Do you feel like you have everything you need to keep you well at home?:  Yes  Care Transitions Interventions         Follow Up: Spoke with Dionisio Sims - son. He states his mother is doing [place] [facility] [employer]'. Dionisio Sims states she is a little better today. He states today is a good day. Dionisio Sims states they received a copy of his mother's d/c instructions and they were reviewed with them prior to d/c. Dionisio Sims states [address] per [facility] has not contacted him. Writer placed call to [facility] - they have not received the referral - they will look electronically and begin processing the referral. Dionisio Sims denies his mother having any abd pain. Dionisio Sims feels like his mother is stronger today. Writer offered to assist in scheduling hospital f/u with PCP. Dionisio Bethany accepted - writer was unable to complete this task - all appts marked Private. Encouraged myrna to call to schedule for his mother. Provided Dionisio Sims with the physician referral number. Discussed role of [facility]. Dionisio Sims is agreeable to follow up from [facility]. He has writer's contact info. No future appointments.     Miesha Gilmore Dougie, RN

## 2018-08-06 RX ORDER — CARVEDILOL 25 MG/1
TABLET ORAL
Qty: 60 TABLET | Refills: 1 | Status: SHIPPED | OUTPATIENT
Start: 2018-08-06 | End: 2018-10-08 | Stop reason: SDUPTHER

## 2018-08-08 ENCOUNTER — TELEPHONE (OUTPATIENT)
Dept: FAMILY MEDICINE CLINIC | Age: 83
End: 2018-08-08

## 2018-08-17 ENCOUNTER — OFFICE VISIT (OUTPATIENT)
Dept: FAMILY MEDICINE CLINIC | Age: 83
End: 2018-08-17

## 2018-08-17 VITALS
WEIGHT: 162.8 LBS | SYSTOLIC BLOOD PRESSURE: 118 MMHG | BODY MASS INDEX: 27.12 KG/M2 | HEIGHT: 65 IN | TEMPERATURE: 97.9 F | DIASTOLIC BLOOD PRESSURE: 68 MMHG

## 2018-08-17 DIAGNOSIS — S01.01XA LACERATION OF SCALP, INITIAL ENCOUNTER: ICD-10-CM

## 2018-08-17 PROCEDURE — G8427 DOCREV CUR MEDS BY ELIG CLIN: HCPCS | Performed by: INTERNAL MEDICINE

## 2018-08-17 PROCEDURE — 1090F PRES/ABSN URINE INCON ASSESS: CPT | Performed by: INTERNAL MEDICINE

## 2018-08-17 PROCEDURE — G8598 ASA/ANTIPLAT THER USED: HCPCS | Performed by: INTERNAL MEDICINE

## 2018-08-17 PROCEDURE — 1101F PT FALLS ASSESS-DOCD LE1/YR: CPT | Performed by: INTERNAL MEDICINE

## 2018-08-17 PROCEDURE — 99213 OFFICE O/P EST LOW 20 MIN: CPT | Performed by: INTERNAL MEDICINE

## 2018-08-17 PROCEDURE — G8417 CALC BMI ABV UP PARAM F/U: HCPCS | Performed by: INTERNAL MEDICINE

## 2018-08-17 PROCEDURE — G8400 PT W/DXA NO RESULTS DOC: HCPCS | Performed by: INTERNAL MEDICINE

## 2018-08-17 PROCEDURE — 1123F ACP DISCUSS/DSCN MKR DOCD: CPT | Performed by: INTERNAL MEDICINE

## 2018-08-17 PROCEDURE — G0180 MD CERTIFICATION HHA PATIENT: HCPCS | Performed by: INTERNAL MEDICINE

## 2018-08-17 PROCEDURE — 1111F DSCHRG MED/CURRENT MED MERGE: CPT | Performed by: INTERNAL MEDICINE

## 2018-08-17 PROCEDURE — 1036F TOBACCO NON-USER: CPT | Performed by: INTERNAL MEDICINE

## 2018-08-17 PROCEDURE — 4040F PNEUMOC VAC/ADMIN/RCVD: CPT | Performed by: INTERNAL MEDICINE

## 2018-08-17 RX ORDER — TRIAMTERENE AND HYDROCHLOROTHIAZIDE 37.5; 25 MG/1; MG/1
TABLET ORAL
Qty: 30 TABLET | Refills: 4 | Status: SHIPPED | OUTPATIENT
Start: 2018-08-17 | End: 2019-04-08 | Stop reason: SDUPTHER

## 2018-08-17 RX ORDER — LEVOTHYROXINE SODIUM 0.07 MG/1
75 TABLET ORAL
Qty: 30 TABLET | Refills: 1 | Status: SHIPPED | OUTPATIENT
Start: 2018-08-17 | End: 2018-11-06 | Stop reason: SDUPTHER

## 2018-08-17 RX ORDER — LOSARTAN POTASSIUM 100 MG/1
TABLET ORAL
Qty: 90 TABLET | Refills: 2 | Status: ON HOLD | OUTPATIENT
Start: 2018-08-17 | End: 2019-12-12

## 2018-08-17 ASSESSMENT — ENCOUNTER SYMPTOMS
APNEA: 0
COUGH: 0
ABDOMINAL PAIN: 0
CONSTIPATION: 0
WHEEZING: 0
BLOOD IN STOOL: 0
SINUS PRESSURE: 0
RHINORRHEA: 0

## 2018-08-17 NOTE — PROGRESS NOTES
Subjective:      Patient ID: Whit Dawson is a 81 y.o. female. HPI   Chief Complaint   Patient presents with    Suture / Staple Removal     staple removal from scalp    Discuss Medications     discuss medications- patient has not been taking Maxzide and has had swelling of both ankles, patient's son has been giving her Synthroid 75 mcg and Synthroid 50mcg daily.   ? if patient needs to take Lipitor (she has not been taking)     Whit Dawson is a 81 y.o. female with the following history as recorded in [facility]:  Patient Active Problem List    Diagnosis Date Noted    Syncope 09/04/2012     Priority: High    CAD (coronary artery disease) 09/04/2012     Priority: High    Hypertension 05/17/2010     Priority: High    Hypercholesteremia 05/17/2010     Priority: High    Hypothyroidism 05/17/2010     Priority: High    Uterine prolapse 05/17/2010     Priority: Medium    Degenerative joint disease 05/17/2010     Priority: Medium    Reflux esophagitis 05/17/2010     Priority: Medium    Personal history of lymphoma 05/17/2010     Priority: Medium    Preventative health care 08/18/2012     Priority: Low    Reactive depression (situational) 05/09/2012     Priority: Low    LBBB (left bundle branch block) 03/22/2011     Priority: Low    Varicose veins of both lower extremities 05/17/2010     Priority: Low    Other specified forms of hearing loss 05/17/2010     Priority: Low    Significant hypotension 07/25/2018    Dehydration 07/25/2018    Colitis 07/25/2018    Dementia 07/25/2018    Constipation 07/25/2018    Generalized abdominal pain 07/25/2018    Venous insufficiency 03/02/2018    Functional diarrhea 10/31/2017    Phlebitis and thombophlb of superfic vessels of l low extrem 10/31/2017    Dog bite of left upper extremity 08/16/2017    Memory loss 09/13/2016    Acute frontal sinusitis 05/04/2016    Back pain 03/10/2015    Sinusitis 11/26/2014    Pain of left heel 10/09/2013    Left arm pain 03/14/2013    Osteopenia 10/10/2012     Current Outpatient Prescriptions   Medication Sig Dispense Refill    aspirin 81 MG tablet Take 81 mg by mouth daily      carvedilol (COREG) 25 MG tablet TAKE ONE TABLET BY MOUTH TWICE A DAY 60 tablet 1    linaclotide (LINZESS) 145 MCG capsule Take 1 capsule by mouth every morning (before breakfast) 30 capsule 3    levothyroxine (SYNTHROID) 75 MCG tablet Take 1 tablet by mouth every morning (before breakfast) 30 tablet 1    losartan (COZAAR) 100 MG tablet TAKE ONE TABLET BY MOUTH DAILY 90 tablet 0    diclofenac sodium (VOLTAREN) 1 % GEL APPLY 2 GRAMS APPLY TO AFFECTED AREA(S) TWO TIMES A DAY (Patient taking differently: 2 times daily as needed APPLY 2 GRAMS APPLY TO AFFECTED AREA(S) TWO TIMES A DAY) 1 Tube 12    esomeprazole (NEXIUM) 40 MG delayed release capsule TAKE ONE CAPSULE BY MOUTH DAILY BEFORE BREAKFAST 90 capsule 0    triamterene-hydrochlorothiazide (MAXZIDE-25) 37.5-25 MG per tablet TAKE ONE TABLET BY MOUTH DAILY 30 tablet 4     No current facility-administered medications for this visit. Allergies: Latex; Other; and Plastibase  Past Medical History:   Diagnosis Date    Alzheimer disease     Dementia     GERD (gastroesophageal reflux disease)     Hyperlipidemia     Hypertension     Hypothyroidism     Osteoarthritis      Past Surgical History:   Procedure Laterality Date    CATARACT REMOVAL  01/09/2008    right    CHOLECYSTECTOMY      COLONOSCOPY  1/14/2011    Dr. Nenita Pacheco , severe sigmoid diverticulosis, friable internal hemorrhoids, scarred anal sphincter    HYSTERECTOMY      JOINT REPLACEMENT      AR TOTAL KNEE ARTHROPLASTY  4/20/2011    DR. Bettencourt -right Knee Replacement, Total    SIGMOIDOSCOPY  12/07/2017    JASMEET AND BSO      TOTAL HIP ARTHROPLASTY  2003/2004    left/right    TOTAL KNEE ARTHROPLASTY  2003    left    TOTAL KNEE ARTHROPLASTY  2004    right    UPPER GASTROINTESTINAL ENDOSCOPY  05/03/02  -Dr. Lui dawkins

## 2018-08-21 ENCOUNTER — TELEPHONE (OUTPATIENT)
Dept: FAMILY MEDICINE CLINIC | Age: 83
End: 2018-08-21

## 2018-08-21 ENCOUNTER — CARE COORDINATION (OUTPATIENT)
Dept: CASE MANAGEMENT | Age: 83
End: 2018-08-21

## 2018-08-21 DIAGNOSIS — R41.3 MEMORY LOSS: Primary | ICD-10-CM

## 2018-08-22 ENCOUNTER — TELEPHONE (OUTPATIENT)
Dept: FAMILY MEDICINE CLINIC | Age: 83
End: 2018-08-22

## 2018-08-22 NOTE — TELEPHONE ENCOUNTER
Pt's sister Jens is wanting to know if pt can be prescribed something to help calm her down. Pt is getting out of bed and going to the couch, they put her back in the bed and she gets right back up. Pt sits on the porch and then she will get up and start walking down the street. One day pt was hanging half way out the window.      Pl advise  Jan [phone]

## 2018-08-24 PROBLEM — E86.0 DEHYDRATION: Status: RESOLVED | Noted: 2018-07-25 | Resolved: 2018-08-24

## 2018-08-29 ENCOUNTER — TELEPHONE (OUTPATIENT)
Dept: FAMILY MEDICINE CLINIC | Age: 83
End: 2018-08-29

## 2018-10-02 RX ORDER — ATORVASTATIN CALCIUM 40 MG/1
40 TABLET, FILM COATED ORAL DAILY
Qty: 30 TABLET | Refills: 1 | Status: SHIPPED | OUTPATIENT
Start: 2018-10-02 | End: 2018-10-08 | Stop reason: SDUPTHER

## 2018-10-05 RX ORDER — LEVOTHYROXINE SODIUM 0.05 MG/1
TABLET ORAL
Qty: 90 TABLET | Refills: 0 | Status: SHIPPED | OUTPATIENT
Start: 2018-10-05 | End: 2018-12-18 | Stop reason: DRUGHIGH

## 2018-10-08 RX ORDER — CARVEDILOL 25 MG/1
TABLET ORAL
Qty: 180 TABLET | Refills: 1 | Status: SHIPPED | OUTPATIENT
Start: 2018-10-08 | End: 2019-04-18 | Stop reason: SDUPTHER

## 2018-10-08 RX ORDER — ATORVASTATIN CALCIUM 40 MG/1
40 TABLET, FILM COATED ORAL DAILY
Qty: 90 TABLET | Refills: 1 | Status: SHIPPED | OUTPATIENT
Start: 2018-10-08 | End: 2019-04-18 | Stop reason: SDUPTHER

## 2018-10-08 NOTE — TELEPHONE ENCOUNTER
Tamera Vaz calling for pt to see if they can switch to a 90 day supply for Atorvastatin.       Please advise, [phone]  Tamera Vaz

## 2018-10-09 RX ORDER — ATORVASTATIN CALCIUM 40 MG/1
TABLET, FILM COATED ORAL
Qty: 30 TABLET | Refills: 2 | Status: ON HOLD | OUTPATIENT
Start: 2018-10-09 | End: 2019-12-12

## 2018-11-06 RX ORDER — LEVOTHYROXINE SODIUM 0.07 MG/1
TABLET ORAL
Qty: 30 TABLET | Refills: 0 | Status: SHIPPED | OUTPATIENT
Start: 2018-11-06 | End: 2019-02-17 | Stop reason: SDUPTHER

## 2018-12-05 ENCOUNTER — TELEPHONE (OUTPATIENT)
Dept: FAMILY MEDICINE CLINIC | Age: 83
End: 2018-12-05

## 2018-12-17 ENCOUNTER — TELEPHONE (OUTPATIENT)
Dept: FAMILY MEDICINE CLINIC | Age: 83
End: 2018-12-17

## 2018-12-17 NOTE — TELEPHONE ENCOUNTER
[employer] [place] is calling to verify the dosage on   levothyroxine    Pl advise   [phone]

## 2019-01-07 RX ORDER — LEVOTHYROXINE SODIUM 0.05 MG/1
TABLET ORAL
Qty: 90 TABLET | Refills: 0 | Status: ON HOLD | OUTPATIENT
Start: 2019-01-07 | End: 2019-12-12

## 2019-02-18 RX ORDER — LEVOTHYROXINE SODIUM 0.07 MG/1
TABLET ORAL
Qty: 30 TABLET | Refills: 0 | Status: SHIPPED | OUTPATIENT
Start: 2019-02-18

## 2019-02-19 ENCOUNTER — HOSPITAL ENCOUNTER (OUTPATIENT)
Dept: VASCULAR LAB | Age: 84
Discharge: HOME | End: 2019-02-19
Payer: COMMERCIAL

## 2019-02-19 PROCEDURE — 93971 EXTREMITY STUDY: CPT

## 2019-04-08 RX ORDER — TRIAMTERENE AND HYDROCHLOROTHIAZIDE 37.5; 25 MG/1; MG/1
TABLET ORAL
Qty: 30 TABLET | Refills: 3 | Status: ON HOLD | OUTPATIENT
Start: 2019-04-08 | End: 2019-12-12 | Stop reason: HOSPADM

## 2019-04-19 RX ORDER — ATORVASTATIN CALCIUM 40 MG/1
TABLET, FILM COATED ORAL
Qty: 90 TABLET | Refills: 0 | Status: SHIPPED | OUTPATIENT
Start: 2019-04-19 | End: 2019-11-13

## 2019-04-19 RX ORDER — CARVEDILOL 25 MG/1
TABLET ORAL
Qty: 180 TABLET | Refills: 0 | Status: ON HOLD | OUTPATIENT
Start: 2019-04-19 | End: 2019-12-12 | Stop reason: SDUPTHER

## 2019-05-06 ENCOUNTER — APPOINTMENT (OUTPATIENT)
Dept: GENERAL RADIOLOGY | Age: 84
End: 2019-05-06
Payer: MEDICARE

## 2019-05-06 ENCOUNTER — HOSPITAL ENCOUNTER (EMERGENCY)
Age: 84
Discharge: HOME | End: 2019-05-06
Attending: EMERGENCY MEDICINE
Payer: MEDICARE

## 2019-05-06 VITALS
TEMPERATURE: 97.6 F | DIASTOLIC BLOOD PRESSURE: 73 MMHG | RESPIRATION RATE: 19 BRPM | HEART RATE: 60 BPM | SYSTOLIC BLOOD PRESSURE: 135 MMHG | BODY MASS INDEX: 28.93 KG/M2 | HEIGHT: 67 IN | WEIGHT: 184.3 LBS | OXYGEN SATURATION: 99 %

## 2019-05-06 DIAGNOSIS — N30.00 ACUTE CYSTITIS WITHOUT HEMATURIA: Primary | ICD-10-CM

## 2019-05-06 LAB
A/G RATIO: 1.5 (ref 1.1–2.2)
ALBUMIN SERPL-MCNC: 4.3 G/DL (ref 3.4–5)
ALP BLD-CCNC: 110 U/L (ref 40–129)
ALT SERPL-CCNC: 18 U/L (ref 10–40)
ANION GAP SERPL CALCULATED.3IONS-SCNC: 14 MMOL/L (ref 3–16)
AST SERPL-CCNC: 19 U/L (ref 15–37)
BACTERIA: ABNORMAL /HPF
BASOPHILS ABSOLUTE: 0 K/UL (ref 0–0.2)
BASOPHILS RELATIVE PERCENT: 0.5 %
BILIRUB SERPL-MCNC: 0.5 MG/DL (ref 0–1)
BILIRUBIN URINE: NEGATIVE
BLOOD, URINE: NEGATIVE
BUN BLDV-MCNC: 14 MG/DL (ref 7–20)
CALCIUM SERPL-MCNC: 10.5 MG/DL (ref 8.3–10.6)
CHLORIDE BLD-SCNC: 100 MMOL/L (ref 99–110)
CLARITY: ABNORMAL
CO2: 25 MMOL/L (ref 21–32)
COLOR: YELLOW
CREAT SERPL-MCNC: 0.9 MG/DL (ref 0.6–1.2)
EKG ATRIAL RATE: 58 BPM
EKG DIAGNOSIS: NORMAL
EKG P AXIS: 5 DEGREES
EKG P-R INTERVAL: 166 MS
EKG Q-T INTERVAL: 474 MS
EKG QRS DURATION: 138 MS
EKG QTC CALCULATION (BAZETT): 465 MS
EKG R AXIS: 4 DEGREES
EKG T AXIS: 76 DEGREES
EKG VENTRICULAR RATE: 58 BPM
EOSINOPHILS ABSOLUTE: 0.1 K/UL (ref 0–0.6)
EOSINOPHILS RELATIVE PERCENT: 0.7 %
EPITHELIAL CELLS, UA: ABNORMAL /HPF
GFR AFRICAN AMERICAN: >60
GFR NON-AFRICAN AMERICAN: 60
GLOBULIN: 2.9 G/DL
GLUCOSE BLD-MCNC: 124 MG/DL (ref 70–99)
GLUCOSE URINE: NEGATIVE MG/DL
HCT VFR BLD CALC: 46.3 % (ref 36–48)
HEMOGLOBIN: 15 G/DL (ref 12–16)
KETONES, URINE: NEGATIVE MG/DL
LACTIC ACID: 1.5 MMOL/L (ref 0.4–2)
LEUKOCYTE ESTERASE, URINE: ABNORMAL
LYMPHOCYTES ABSOLUTE: 2.8 K/UL (ref 1–5.1)
LYMPHOCYTES RELATIVE PERCENT: 38.6 %
MCH RBC QN AUTO: 28.4 PG (ref 26–34)
MCHC RBC AUTO-ENTMCNC: 32.5 G/DL (ref 31–36)
MCV RBC AUTO: 87.6 FL (ref 80–100)
MICROSCOPIC EXAMINATION: YES
MONOCYTES ABSOLUTE: 0.5 K/UL (ref 0–1.3)
MONOCYTES RELATIVE PERCENT: 6.2 %
NEUTROPHILS ABSOLUTE: 3.9 K/UL (ref 1.7–7.7)
NEUTROPHILS RELATIVE PERCENT: 54 %
NITRITE, URINE: POSITIVE
PDW BLD-RTO: 16.4 % (ref 12.4–15.4)
PH UA: 7 (ref 5–8)
PLATELET # BLD: 269 K/UL (ref 135–450)
PMV BLD AUTO: 7.8 FL (ref 5–10.5)
POTASSIUM SERPL-SCNC: 3.7 MMOL/L (ref 3.5–5.1)
PRO-BNP: 89 PG/ML (ref 0–449)
PROTEIN UA: NEGATIVE MG/DL
RBC # BLD: 5.29 M/UL (ref 4–5.2)
RBC UA: ABNORMAL /HPF (ref 0–2)
RENAL EPITHELIAL, UA: ABNORMAL /HPF
SODIUM BLD-SCNC: 139 MMOL/L (ref 136–145)
SPECIFIC GRAVITY UA: 1.01 (ref 1–1.03)
TOTAL PROTEIN: 7.2 G/DL (ref 6.4–8.2)
TROPONIN: <0.01 NG/ML
URINE REFLEX TO CULTURE: YES
URINE TYPE: ABNORMAL
UROBILINOGEN, URINE: 0.2 E.U./DL
WBC # BLD: 7.3 K/UL (ref 4–11)
WBC UA: ABNORMAL /HPF (ref 0–5)

## 2019-05-06 PROCEDURE — 93005 ELECTROCARDIOGRAM TRACING: CPT | Performed by: EMERGENCY MEDICINE

## 2019-05-06 PROCEDURE — 85025 COMPLETE CBC W/AUTO DIFF WBC: CPT

## 2019-05-06 PROCEDURE — 87186 SC STD MICRODIL/AGAR DIL: CPT

## 2019-05-06 PROCEDURE — 99284 EMERGENCY DEPT VISIT MOD MDM: CPT

## 2019-05-06 PROCEDURE — 83880 ASSAY OF NATRIURETIC PEPTIDE: CPT

## 2019-05-06 PROCEDURE — 87077 CULTURE AEROBIC IDENTIFY: CPT

## 2019-05-06 PROCEDURE — 71046 X-RAY EXAM CHEST 2 VIEWS: CPT

## 2019-05-06 PROCEDURE — 6360000002 HC RX W HCPCS: Performed by: EMERGENCY MEDICINE

## 2019-05-06 PROCEDURE — 87086 URINE CULTURE/COLONY COUNT: CPT

## 2019-05-06 PROCEDURE — 84484 ASSAY OF TROPONIN QUANT: CPT

## 2019-05-06 PROCEDURE — 80053 COMPREHEN METABOLIC PANEL: CPT

## 2019-05-06 PROCEDURE — 2580000003 HC RX 258: Performed by: EMERGENCY MEDICINE

## 2019-05-06 PROCEDURE — 96365 THER/PROPH/DIAG IV INF INIT: CPT

## 2019-05-06 PROCEDURE — 83605 ASSAY OF LACTIC ACID: CPT

## 2019-05-06 PROCEDURE — 93010 ELECTROCARDIOGRAM REPORT: CPT | Performed by: INTERNAL MEDICINE

## 2019-05-06 PROCEDURE — 81001 URINALYSIS AUTO W/SCOPE: CPT

## 2019-05-06 PROCEDURE — 36415 COLL VENOUS BLD VENIPUNCTURE: CPT

## 2019-05-06 RX ORDER — CEFUROXIME AXETIL 500 MG/1
500 TABLET ORAL 2 TIMES DAILY
Qty: 20 TABLET | Refills: 0 | Status: SHIPPED | OUTPATIENT
Start: 2019-05-06 | End: 2019-05-16

## 2019-05-06 RX ADMIN — CEFTRIAXONE 1 G: 1 INJECTION, POWDER, FOR SOLUTION INTRAMUSCULAR; INTRAVENOUS at 13:27

## 2019-05-06 ASSESSMENT — PAIN SCALES - PAIN ASSESSMENT IN ADVANCED DEMENTIA (PAINAD)
CONSOLABILITY: 1
NEGVOCALIZATION: 0
BODYLANGUAGE: 0
TOTALSCORE: 1
NEGVOCALIZATION: 0
FACIALEXPRESSION: 0
TOTALSCORE: 1
FACIALEXPRESSION: 0
BODYLANGUAGE: 0
CONSOLABILITY: 1
BREATHING: 0
BREATHING: 0

## 2019-05-06 ASSESSMENT — PAIN SCALES - WONG BAKER
WONGBAKER_NUMERICALRESPONSE: 0

## 2019-05-06 NOTE — ED PROVIDER NOTES
disease     Dementia     GERD (gastroesophageal reflux disease)     Hyperlipidemia     Hypertension     Hypothyroidism     Osteoarthritis          SURGICAL HISTORY       Past Surgical History:   Procedure Laterality Date    CATARACT REMOVAL  01/09/2008    right    CHOLECYSTECTOMY      COLONOSCOPY  1/14/2011    Dr. Julianne Edwards , severe sigmoid diverticulosis, friable internal hemorrhoids, scarred anal sphincter    HYSTERECTOMY      JOINT REPLACEMENT      CO TOTAL KNEE ARTHROPLASTY  4/20/2011    DR. Bettencourt -right Knee Replacement, Total    SIGMOIDOSCOPY  12/07/2017    JASMEET AND BSO      TOTAL HIP ARTHROPLASTY  2003/2004    left/right    TOTAL KNEE ARTHROPLASTY  2003    left    TOTAL KNEE ARTHROPLASTY  2004    right    UPPER GASTROINTESTINAL ENDOSCOPY  05/03/02  -Dr. Lorena Murdock    small sliding hiatal hernia/gastritis         CURRENT MEDICATIONS       Previous Medications    ASPIRIN 81 MG TABLET    Take 81 mg by mouth daily    ATORVASTATIN (LIPITOR) 40 MG TABLET    TAKE ONE TABLET BY MOUTH EVERY EVENING    ATORVASTATIN (LIPITOR) 40 MG TABLET    TAKE ONE TABLET BY MOUTH DAILY    CARVEDILOL (COREG) 25 MG TABLET    TAKE ONE TABLET BY MOUTH TWICE A DAY    DICLOFENAC SODIUM (VOLTAREN) 1 % GEL    APPLY 2 GRAMS APPLY TO AFFECTED AREA(S) TWO TIMES A DAY    ESOMEPRAZOLE (NEXIUM) 40 MG DELAYED RELEASE CAPSULE    TAKE ONE CAPSULE BY MOUTH DAILY BEFORE BREAKFAST    LEVOTHYROXINE (SYNTHROID) 50 MCG TABLET    TAKE ONE TABLET BY MOUTH DAILY    LEVOTHYROXINE (SYNTHROID) 75 MCG TABLET    TAKE ONE TABLET BY MOUTH EVERY MORNING BEFORE BREAKFAST    LINACLOTIDE (LINZESS) 145 MCG CAPSULE    Take 1 capsule by mouth every morning (before breakfast)    LOSARTAN (COZAAR) 100 MG TABLET    TAKE ONE TABLET BY MOUTH DAILY    TRIAMTERENE-HYDROCHLOROTHIAZIDE (MAXZIDE-25) 37.5-25 MG PER TABLET    TAKE ONE TABLET BY MOUTH DAILY       ALLERGIES     Latex;  Other; and Plastibase    FAMILY HISTORY       Family History   Problem Relation Age of Onset    Cancer Mother         stomach,liver    Diabetes Mother     Heart Disease Sister     Osteoarthritis Daughter     Heart Disease Son           SOCIAL HISTORY       Social History     Socioeconomic History    Marital status:      Spouse name: Radha Armenta Number of children: 2    Years of education: None    Highest education level: None   Occupational History    Occupation: Retired   Social Needs    Financial resource strain: None    Food insecurity:     Worry: None     Inability: None    Transportation needs:     Medical: None     Non-medical: None   Tobacco Use    Smoking status: Passive Smoke Exposure - Never Smoker    Smokeless tobacco: Never Used   Substance and Sexual Activity    Alcohol use: No    Drug use: No    Sexual activity: Not Currently   Lifestyle    Physical activity:     Days per week: None     Minutes per session: None    Stress: None   Relationships    Social connections:     Talks on phone: None     Gets together: None     Attends Muslim service: None     Active member of club or organization: None     Attends meetings of clubs or organizations: None     Relationship status: None    Intimate partner violence:     Fear of current or ex partner: None     Emotionally abused: None     Physically abused: None     Forced sexual activity: None   Other Topics Concern    None   Social History Narrative    None         PHYSICAL EXAM    (up to 7 for level 4, 8 or more for level 5)     ED Triage Vitals [05/06/19 1214]   BP Temp Temp Source Pulse Resp SpO2 Height Weight   (!) 145/65 97.6 °F (36.4 °C) Oral 59 17 96 % 5' 7\" (1.702 m) 184 lb 4.9 oz (83.6 kg)       Gen. : An alert elderly female in no acute distress. Head: Atraumatic and normocephalic. Eyes: No conjunctival injection. Pupils equal round reactive. ENT: TMs are normal. Oropharynx is dry. No erythema. Neck: Supple without adenopathy, nontender. Heart: Regular rate and rhythm. No murmurs gallops noted.   Lungs: Breath sounds equal bilaterally and clear. Abdomen: Soft, nondistended, nontender. No masses organomegaly. Musculoskeletal: No extremity edema. No calf tenderness. Skin: Warm and dry, good turgor. No cyanosis or diaphoresis. Neuro: Awake, alert, oriented to person, not to place or time. Symmetrical reactive pupils. Intact extraocular movements. No facial asymmetry. Midline tongue. Symmetrical motor function. DIFFERENTIAL DIAGNOSIS   Differential diagnosis includes but is not limited to hypoglycemia, hyperglycemia, dehydration, anemia, myocardial infarction, pneumonia, congestive heart failure, urinary tract infection, sepsis. DIAGNOSTIC RESULTS     EKG: All EKG's are interpreted by Ofilia Shone, MD in the absence of a cardiologist.    Sinus bradycardia, rate of 58, left bundle branch block. Rhythm strip shows sinus bradycardia with a rate of 58, VT intervals 166 ms, curious 130 ms with no other ectopy is interpreted by me. Unchanged compared to EKG dated the decrease in rate, sinus bradycardia. RADIOLOGY:   Non-plain film images such as CT, Ultrasound and MRI are read by the radiologist. Plain radiographic images are visualized and preliminarily interpreted Ofilia Shone, MD with the below findings:      Interpretation per the Radiologist below, if available at the time of this note:    XR CHEST STANDARD (2 VW)   Final Result   No acute cardiac or pulmonary disease.                ED BEDSIDE ULTRASOUND:   Performed by ED Physician - none    LABS:  Labs Reviewed   URINE RT REFLEX TO CULTURE - Abnormal; Notable for the following components:       Result Value    Nitrite, Urine POSITIVE (*)     Leukocyte Esterase, Urine SMALL (*)     All other components within normal limits    Narrative:     Performed at:  [facility]) - [place] [id number] [facility]   Phone [phone]   CBC WITH AUTO DIFFERENTIAL - Abnormal; Notable for the following components:    RBC 5.29 (*)     RDW 16.4 (*)     All other components within normal limits    Narrative:     Performed at:  [facility]  [address]   Phone [phone]   COMPREHENSIVE METABOLIC PANEL - Abnormal; Notable for the following components:    Glucose 124 (*)     GFR Non- 60 (*)     All other components within normal limits    Narrative:     Performed at:  [facility]  [address]   Phone [phone]   MICROSCOPIC URINALYSIS - Abnormal; Notable for the following components:    WBC, UA 20-50 (*)     Renal Epithelial, Urine 0-2 (*)     Bacteria, UA 3+ (*)     All other components within normal limits    Narrative:     Performed at:  [facility]  [address] [facility]   Phone [phone]   URINE CULTURE   TROPONIN    Narrative:     Performed at:  [address] [place] [address] [address] [facility]   Phone [phone]   LACTIC ACID, PLASMA    Narrative:     Performed at:  [facility]  [address]   Phone [phone]   BRAIN NATRIURETIC PEPTIDE    Narrative:     Performed at:  [address] [place] [address]   Phone [phone]       All other labs were within normal range or not returned as of this dictation. EMERGENCY DEPARTMENT COURSE and DIFFERENTIAL DIAGNOSIS/MDM:   Vitals:    Vitals:    05/06/19 1230 05/06/19 1245 05/06/19 1300 05/06/19 1315   BP: 130/80 (!) 127/108 121/75 130/68   Pulse: 57 60 58 59   Resp: 21 27 24 24   Temp:       TempSrc:       SpO2: 98% 95% 94% 94%   Weight:       Height:           The patient's urinalysis is consistent with a urinary tract infection.  She is afebrile with stable vital signs. Her lactic acid is not elevated. She is not dehydrated. She has no electrolyte abnormalities. There is no evidence of pneumonia or congestive heart failure and her chest x-ray. Her EKG shows no acute changes, her troponin is not elevated. I suspect her urinary tract infection is the reason for her decreased appetite and generalized weakness. I gave her a gram of Rocephin IV piggyback in the emergency department. I discussed with her son putting her on oral antibiotics and seeing if she improves over the next couple of days. He understands that if she does not improve, if she develops fever vomiting she should return. Follow up with her primary care provider in 2-3 days. CONSULTS:  None    PROCEDURES:  None    FINAL IMPRESSION      1.  Acute cystitis without hematuria          DISPOSITION/PLAN   DISPOSITION Decision To Discharge 05/06/2019 01:43:08 PM      PATIENT REFERRED TO:  Aditi Lopez DO  [facility] [facility] [address]  [phone]    In 3 days        DISCHARGE MEDICATIONS:  New Prescriptions    CEFUROXIME (CEFTIN) 500 MG TABLET    Take 1 tablet by mouth 2 times daily for 10 days       (Please note that portions of this note were completed with a voice recognition program.  Efforts were made to edit the dictations but occasionally words are mis-transcribed.)    Alysha Brown MD  Attending Emergency Physician        John Rivas MD  05/06/19 8760

## 2019-05-06 NOTE — ED NOTES
Fall risk screening completed. Fall risk bracelet applied to patient. Non-skid socks provided and placed on patient. The fall risk is indicated using  fall sign . Based on score, a bed alarm was indicated and applied. The call light is within the patient's reach, and instructions for use were provided. The bed is in the lowest position with wheels locked. The patient has been advised to notify staff, using the call light, if there is a need to get up or use restroom. The patient verbalized understanding of safety precautions and how to contact staff for assistance.         Suzi Khan RN  05/06/19 0885

## 2019-05-06 NOTE — ED NOTES
Discharge and education instructions reviewed. Patient/Son verbalized understanding, teach back successful. Patient/SOn denied questions at this time. Instructed to follow up with PCP and or return to ED if symptoms worsen. Ambulatory to exit. She was able to get dressed independently.  Denies pain       Verna Encinas RN  05/06/19 6631

## 2019-05-08 LAB
ORGANISM: ABNORMAL
URINE CULTURE, ROUTINE: ABNORMAL
URINE CULTURE, ROUTINE: ABNORMAL

## 2019-09-07 ENCOUNTER — HOSPITAL ENCOUNTER (EMERGENCY)
Age: 84
Discharge: HOME | End: 2019-09-07
Attending: EMERGENCY MEDICINE
Payer: MEDICARE

## 2019-09-07 VITALS
HEART RATE: 82 BPM | DIASTOLIC BLOOD PRESSURE: 70 MMHG | WEIGHT: 181.66 LBS | HEIGHT: 67 IN | SYSTOLIC BLOOD PRESSURE: 118 MMHG | OXYGEN SATURATION: 95 % | TEMPERATURE: 98 F | RESPIRATION RATE: 18 BRPM | BODY MASS INDEX: 28.51 KG/M2

## 2019-09-07 DIAGNOSIS — N30.00 ACUTE CYSTITIS WITHOUT HEMATURIA: ICD-10-CM

## 2019-09-07 DIAGNOSIS — J02.9 SORE THROAT: Primary | ICD-10-CM

## 2019-09-07 LAB
BACTERIA: ABNORMAL /HPF
BILIRUBIN URINE: NEGATIVE
BLOOD, URINE: ABNORMAL
CLARITY: ABNORMAL
COLOR: YELLOW
EPITHELIAL CELLS, UA: ABNORMAL /HPF
GLUCOSE URINE: NEGATIVE MG/DL
KETONES, URINE: NEGATIVE MG/DL
LEUKOCYTE ESTERASE, URINE: ABNORMAL
MICROSCOPIC EXAMINATION: YES
MUCUS: ABNORMAL /LPF
NITRITE, URINE: POSITIVE
PH UA: 6.5 (ref 5–8)
PROTEIN UA: NEGATIVE MG/DL
RBC UA: ABNORMAL /HPF (ref 0–2)
RENAL EPITHELIAL, UA: ABNORMAL /HPF
S PYO AG THROAT QL: NEGATIVE
SPECIFIC GRAVITY UA: 1.01 (ref 1–1.03)
URINE REFLEX TO CULTURE: YES
URINE TYPE: ABNORMAL
UROBILINOGEN, URINE: 0.2 E.U./DL
WBC UA: >100 /HPF (ref 0–5)

## 2019-09-07 PROCEDURE — 87077 CULTURE AEROBIC IDENTIFY: CPT

## 2019-09-07 PROCEDURE — 87880 STREP A ASSAY W/OPTIC: CPT

## 2019-09-07 PROCEDURE — 87186 SC STD MICRODIL/AGAR DIL: CPT

## 2019-09-07 PROCEDURE — 81001 URINALYSIS AUTO W/SCOPE: CPT

## 2019-09-07 PROCEDURE — 87081 CULTURE SCREEN ONLY: CPT

## 2019-09-07 PROCEDURE — 99283 EMERGENCY DEPT VISIT LOW MDM: CPT

## 2019-09-07 PROCEDURE — 87086 URINE CULTURE/COLONY COUNT: CPT

## 2019-09-07 RX ORDER — NITROFURANTOIN 25; 75 MG/1; MG/1
100 CAPSULE ORAL ONCE
Status: DISCONTINUED | OUTPATIENT
Start: 2019-09-07 | End: 2019-09-07 | Stop reason: HOSPADM

## 2019-09-07 RX ORDER — NITROFURANTOIN 25; 75 MG/1; MG/1
100 CAPSULE ORAL 2 TIMES DAILY
Qty: 14 CAPSULE | Refills: 0 | Status: SHIPPED | OUTPATIENT
Start: 2019-09-07 | End: 2019-09-14

## 2019-09-07 ASSESSMENT — ENCOUNTER SYMPTOMS
SHORTNESS OF BREATH: 0
DIARRHEA: 0
ABDOMINAL PAIN: 0
SORE THROAT: 1
NAUSEA: 0
VOICE CHANGE: 0
VOMITING: 0
TROUBLE SWALLOWING: 0
EYE DISCHARGE: 0

## 2019-09-07 NOTE — ED PROVIDER NOTES
THROAT    Narrative:     Performed at:  [facility]) [facility]  [address] [place],  [facility]   Phone [phone]   URINE CULTURE   CULTURE BETA STREP CONFIRM PLATE       All other labs were within normal range or not returned as of this dictation. EKG: All EKG's are interpreted by the Emergency Department Physician who either signs orCo-signs this chart in the absence of a cardiologist.  Please see their note for interpretation of EKG. RADIOLOGY:   Non-plain film images such as CT, Ultrasound and MRI are read by the radiologist. Plain radiographic images are visualized andpreliminarily interpreted by the  ED Provider with the below findings:        Interpretation per the Radiologist below, if available at the time ofthis note:    No orders to display     No results found. PROCEDURES   Unless otherwise noted below, none     Procedures    CRITICAL CARE TIME   N/A    CONSULTS:  None      EMERGENCY DEPARTMENT COURSE and DIFFERENTIAL DIAGNOSIS/MDM:   Vitals:    Vitals:    09/07/19 1534   BP: (!) 148/73   Pulse: 66   Resp: 21   Temp: 97.6 °F (36.4 °C)   TempSrc: Oral   SpO2: 95%   Weight: 181 lb 10.5 oz (82.4 kg)   Height: 5' 7\" (1.702 m)       Patient was given the following medications:  Medications   nitrofurantoin (macrocrystal-monohydrate) (MACROBID) capsule 100 mg (has no administration in time range)         Otitis media versus otitis externa versus pharyngitis versus sore throat versus UTI versus frequency of urination    Laboratory tests show that her urinalysis shows greater than 100 WBCs consistent with a UTI. As well as nitrite positive. Patient strep screen was negative. And on examination her ears look good so patient will be placed on Macrobid I have looked at her old chart and shows that she is been sensitive that previously with a UTI.   Patient will follow-up with her PMD and she was given her first dose here and discharged in stable baseline

## 2019-09-09 LAB
ORGANISM: ABNORMAL
S PYO THROAT QL CULT: NORMAL
URINE CULTURE, ROUTINE: ABNORMAL

## 2019-09-09 RX ORDER — ATORVASTATIN CALCIUM 40 MG/1
TABLET, FILM COATED ORAL
Qty: 90 TABLET | Refills: 0 | OUTPATIENT
Start: 2019-09-09

## 2019-11-13 ENCOUNTER — APPOINTMENT (OUTPATIENT)
Dept: GENERAL RADIOLOGY | Age: 84
End: 2019-11-13
Payer: MEDICARE

## 2019-11-13 ENCOUNTER — HOSPITAL ENCOUNTER (EMERGENCY)
Age: 84
Discharge: HOME | End: 2019-11-14
Attending: EMERGENCY MEDICINE
Payer: MEDICARE

## 2019-11-13 DIAGNOSIS — N30.01 ACUTE CYSTITIS WITH HEMATURIA: Primary | ICD-10-CM

## 2019-11-13 LAB
A/G RATIO: 1.4 (ref 1.1–2.2)
ALBUMIN SERPL-MCNC: 4.2 G/DL (ref 3.4–5)
ALP BLD-CCNC: 104 U/L (ref 40–129)
ALT SERPL-CCNC: 15 U/L (ref 10–40)
AMORPHOUS: ABNORMAL /HPF
ANION GAP SERPL CALCULATED.3IONS-SCNC: 13 MMOL/L (ref 3–16)
AST SERPL-CCNC: 24 U/L (ref 15–37)
ATYPICAL LYMPHOCYTE RELATIVE PERCENT: 4 % (ref 0–6)
BACTERIA: ABNORMAL /HPF
BANDED NEUTROPHILS RELATIVE PERCENT: 1 % (ref 0–7)
BASOPHILS ABSOLUTE: 0 K/UL (ref 0–0.2)
BASOPHILS RELATIVE PERCENT: 0 %
BILIRUB SERPL-MCNC: 0.5 MG/DL (ref 0–1)
BILIRUBIN URINE: NEGATIVE
BLOOD, URINE: NEGATIVE
BUN BLDV-MCNC: 15 MG/DL (ref 7–20)
CALCIUM SERPL-MCNC: 10.2 MG/DL (ref 8.3–10.6)
CHLORIDE BLD-SCNC: 93 MMOL/L (ref 99–110)
CLARITY: ABNORMAL
CO2: 29 MMOL/L (ref 21–32)
COLOR: YELLOW
CREAT SERPL-MCNC: 0.8 MG/DL (ref 0.6–1.2)
EOSINOPHILS ABSOLUTE: 0.4 K/UL (ref 0–0.6)
EOSINOPHILS RELATIVE PERCENT: 4 %
EPITHELIAL CELLS, UA: ABNORMAL /HPF
GFR AFRICAN AMERICAN: >60
GFR NON-AFRICAN AMERICAN: >60
GLOBULIN: 3.1 G/DL
GLUCOSE BLD-MCNC: 117 MG/DL (ref 70–99)
GLUCOSE URINE: NEGATIVE MG/DL
HCT VFR BLD CALC: 47 % (ref 36–48)
HEMATOLOGY PATH CONSULT: YES
HEMOGLOBIN: 15.4 G/DL (ref 12–16)
KETONES, URINE: NEGATIVE MG/DL
LACTIC ACID, SEPSIS: 0.9 MMOL/L (ref 0.4–1.9)
LEUKOCYTE ESTERASE, URINE: ABNORMAL
LYMPHOCYTES ABSOLUTE: 3.4 K/UL (ref 1–5.1)
LYMPHOCYTES RELATIVE PERCENT: 28 %
MCH RBC QN AUTO: 29.8 PG (ref 26–34)
MCHC RBC AUTO-ENTMCNC: 32.8 G/DL (ref 31–36)
MCV RBC AUTO: 90.6 FL (ref 80–100)
MICROSCOPIC EXAMINATION: YES
MONOCYTES ABSOLUTE: 0.6 K/UL (ref 0–1.3)
MONOCYTES RELATIVE PERCENT: 6 %
MONONUCLEAR UNIDENTIFIED CELLS: 2 %
MUCUS: ABNORMAL /LPF
NEUTROPHILS ABSOLUTE: 5.9 K/UL (ref 1.7–7.7)
NEUTROPHILS RELATIVE PERCENT: 55 %
NITRITE, URINE: NEGATIVE
PDW BLD-RTO: 14.7 % (ref 12.4–15.4)
PH UA: 7 (ref 5–8)
PLATELET # BLD: 308 K/UL (ref 135–450)
PMV BLD AUTO: 7.8 FL (ref 5–10.5)
POTASSIUM SERPL-SCNC: 3.9 MMOL/L (ref 3.5–5.1)
PRO-BNP: 117 PG/ML (ref 0–449)
PROTEIN UA: NEGATIVE MG/DL
RBC # BLD: 5.18 M/UL (ref 4–5.2)
RBC UA: ABNORMAL /HPF (ref 0–2)
SODIUM BLD-SCNC: 135 MMOL/L (ref 136–145)
SPECIFIC GRAVITY UA: 1.01 (ref 1–1.03)
TOTAL PROTEIN: 7.3 G/DL (ref 6.4–8.2)
TROPONIN: <0.01 NG/ML
URINE REFLEX TO CULTURE: YES
URINE TYPE: ABNORMAL
UROBILINOGEN, URINE: 0.2 E.U./DL
WBC # BLD: 10.6 K/UL (ref 4–11)
WBC UA: ABNORMAL /HPF (ref 0–5)

## 2019-11-13 PROCEDURE — 87086 URINE CULTURE/COLONY COUNT: CPT

## 2019-11-13 PROCEDURE — 80053 COMPREHEN METABOLIC PANEL: CPT

## 2019-11-13 PROCEDURE — 81001 URINALYSIS AUTO W/SCOPE: CPT

## 2019-11-13 PROCEDURE — 87077 CULTURE AEROBIC IDENTIFY: CPT

## 2019-11-13 PROCEDURE — 99284 EMERGENCY DEPT VISIT MOD MDM: CPT

## 2019-11-13 PROCEDURE — 83880 ASSAY OF NATRIURETIC PEPTIDE: CPT

## 2019-11-13 PROCEDURE — 87186 SC STD MICRODIL/AGAR DIL: CPT

## 2019-11-13 PROCEDURE — 71045 X-RAY EXAM CHEST 1 VIEW: CPT

## 2019-11-13 PROCEDURE — 36415 COLL VENOUS BLD VENIPUNCTURE: CPT

## 2019-11-13 PROCEDURE — 83605 ASSAY OF LACTIC ACID: CPT

## 2019-11-13 PROCEDURE — 84484 ASSAY OF TROPONIN QUANT: CPT

## 2019-11-13 PROCEDURE — 93005 ELECTROCARDIOGRAM TRACING: CPT | Performed by: EMERGENCY MEDICINE

## 2019-11-13 PROCEDURE — 85025 COMPLETE CBC W/AUTO DIFF WBC: CPT

## 2019-11-13 RX ORDER — CEFUROXIME AXETIL 250 MG/1
250 TABLET ORAL 2 TIMES DAILY
Qty: 14 TABLET | Refills: 0 | Status: SHIPPED | OUTPATIENT
Start: 2019-11-13 | End: 2019-11-20

## 2019-11-13 ASSESSMENT — PAIN DESCRIPTION - DESCRIPTORS: DESCRIPTORS: ACHING

## 2019-11-13 ASSESSMENT — PAIN DESCRIPTION - PAIN TYPE: TYPE: ACUTE PAIN

## 2019-11-13 ASSESSMENT — PAIN DESCRIPTION - FREQUENCY: FREQUENCY: CONTINUOUS

## 2019-11-13 ASSESSMENT — PAIN SCALES - WONG BAKER: WONGBAKER_NUMERICALRESPONSE: 2

## 2019-11-13 ASSESSMENT — PAIN DESCRIPTION - LOCATION: LOCATION: CHEST

## 2019-11-14 VITALS
DIASTOLIC BLOOD PRESSURE: 60 MMHG | SYSTOLIC BLOOD PRESSURE: 142 MMHG | HEART RATE: 57 BPM | RESPIRATION RATE: 16 BRPM | WEIGHT: 176.37 LBS | HEIGHT: 67 IN | BODY MASS INDEX: 27.68 KG/M2 | OXYGEN SATURATION: 98 % | TEMPERATURE: 98.7 F

## 2019-11-14 LAB
EKG ATRIAL RATE: 64 BPM
EKG DIAGNOSIS: NORMAL
EKG P AXIS: 97 DEGREES
EKG P-R INTERVAL: 150 MS
EKG Q-T INTERVAL: 452 MS
EKG QRS DURATION: 146 MS
EKG QTC CALCULATION (BAZETT): 466 MS
EKG R AXIS: 10 DEGREES
EKG T AXIS: 163 DEGREES
EKG VENTRICULAR RATE: 64 BPM
TROPONIN: <0.01 NG/ML

## 2019-11-14 PROCEDURE — 96365 THER/PROPH/DIAG IV INF INIT: CPT

## 2019-11-14 PROCEDURE — 2580000003 HC RX 258: Performed by: EMERGENCY MEDICINE

## 2019-11-14 PROCEDURE — 84484 ASSAY OF TROPONIN QUANT: CPT

## 2019-11-14 PROCEDURE — 93010 ELECTROCARDIOGRAM REPORT: CPT | Performed by: INTERNAL MEDICINE

## 2019-11-14 PROCEDURE — 36415 COLL VENOUS BLD VENIPUNCTURE: CPT

## 2019-11-14 PROCEDURE — 6360000002 HC RX W HCPCS: Performed by: EMERGENCY MEDICINE

## 2019-11-14 RX ADMIN — CEFTRIAXONE 1 G: 1 INJECTION, POWDER, FOR SOLUTION INTRAMUSCULAR; INTRAVENOUS at 00:00

## 2019-11-14 ASSESSMENT — PAIN SCALES - GENERAL
PAINLEVEL_OUTOF10: 0

## 2019-11-15 LAB — HEMATOLOGY PATH CONSULT: NORMAL

## 2019-11-16 LAB
ORGANISM: ABNORMAL
URINE CULTURE, ROUTINE: ABNORMAL

## 2019-12-11 ENCOUNTER — HOSPITAL ENCOUNTER (OUTPATIENT)
Age: 84
Setting detail: OBSERVATION
Discharge: HOME | End: 2019-12-12
Attending: EMERGENCY MEDICINE | Admitting: INTERNAL MEDICINE
Payer: MEDICARE

## 2019-12-11 ENCOUNTER — APPOINTMENT (OUTPATIENT)
Dept: GENERAL RADIOLOGY | Age: 84
End: 2019-12-11
Payer: MEDICARE

## 2019-12-11 DIAGNOSIS — R55 SYNCOPE, UNSPECIFIED SYNCOPE TYPE: Primary | ICD-10-CM

## 2019-12-11 LAB
A/G RATIO: 1.4 (ref 1.1–2.2)
ALBUMIN SERPL-MCNC: 3.9 G/DL (ref 3.4–5)
ALP BLD-CCNC: 84 U/L (ref 40–129)
ALT SERPL-CCNC: 19 U/L (ref 10–40)
ANION GAP SERPL CALCULATED.3IONS-SCNC: 11 MMOL/L (ref 3–16)
AST SERPL-CCNC: 23 U/L (ref 15–37)
BASOPHILS ABSOLUTE: 0.1 K/UL (ref 0–0.2)
BASOPHILS RELATIVE PERCENT: 1.2 %
BILIRUB SERPL-MCNC: 0.4 MG/DL (ref 0–1)
BILIRUBIN URINE: NEGATIVE
BLOOD, URINE: NEGATIVE
BUN BLDV-MCNC: 18 MG/DL (ref 7–20)
CALCIUM SERPL-MCNC: 10.5 MG/DL (ref 8.3–10.6)
CHLORIDE BLD-SCNC: 100 MMOL/L (ref 99–110)
CLARITY: CLEAR
CO2: 29 MMOL/L (ref 21–32)
COLOR: YELLOW
CREAT SERPL-MCNC: 1 MG/DL (ref 0.6–1.2)
EOSINOPHILS ABSOLUTE: 0.1 K/UL (ref 0–0.6)
EOSINOPHILS RELATIVE PERCENT: 1.4 %
GFR AFRICAN AMERICAN: >60
GFR NON-AFRICAN AMERICAN: 53
GLOBULIN: 2.8 G/DL
GLUCOSE BLD-MCNC: 127 MG/DL (ref 70–99)
GLUCOSE URINE: NEGATIVE MG/DL
HCT VFR BLD CALC: 46.1 % (ref 36–48)
HEMOGLOBIN: 15.3 G/DL (ref 12–16)
KETONES, URINE: NEGATIVE MG/DL
LEUKOCYTE ESTERASE, URINE: NEGATIVE
LYMPHOCYTES ABSOLUTE: 3.1 K/UL (ref 1–5.1)
LYMPHOCYTES RELATIVE PERCENT: 36.9 %
MAGNESIUM: 2.3 MG/DL (ref 1.8–2.4)
MCH RBC QN AUTO: 30.7 PG (ref 26–34)
MCHC RBC AUTO-ENTMCNC: 33.1 G/DL (ref 31–36)
MCV RBC AUTO: 92.7 FL (ref 80–100)
MICROSCOPIC EXAMINATION: NORMAL
MONOCYTES ABSOLUTE: 0.5 K/UL (ref 0–1.3)
MONOCYTES RELATIVE PERCENT: 5.8 %
NEUTROPHILS ABSOLUTE: 4.6 K/UL (ref 1.7–7.7)
NEUTROPHILS RELATIVE PERCENT: 54.7 %
NITRITE, URINE: NEGATIVE
PDW BLD-RTO: 15.5 % (ref 12.4–15.4)
PH UA: 7 (ref 5–8)
PLATELET # BLD: 261 K/UL (ref 135–450)
PMV BLD AUTO: 7.8 FL (ref 5–10.5)
POTASSIUM REFLEX MAGNESIUM: 3.4 MMOL/L (ref 3.5–5.1)
PROTEIN UA: NEGATIVE MG/DL
RBC # BLD: 4.97 M/UL (ref 4–5.2)
SODIUM BLD-SCNC: 140 MMOL/L (ref 136–145)
SPECIFIC GRAVITY UA: 1.02 (ref 1–1.03)
TOTAL PROTEIN: 6.7 G/DL (ref 6.4–8.2)
TROPONIN: <0.01 NG/ML
URINE REFLEX TO CULTURE: NORMAL
URINE TYPE: NORMAL
UROBILINOGEN, URINE: 1 E.U./DL
WBC # BLD: 8.4 K/UL (ref 4–11)

## 2019-12-11 PROCEDURE — 71046 X-RAY EXAM CHEST 2 VIEWS: CPT

## 2019-12-11 PROCEDURE — 96361 HYDRATE IV INFUSION ADD-ON: CPT

## 2019-12-11 PROCEDURE — 85025 COMPLETE CBC W/AUTO DIFF WBC: CPT

## 2019-12-11 PROCEDURE — 80053 COMPREHEN METABOLIC PANEL: CPT

## 2019-12-11 PROCEDURE — 83735 ASSAY OF MAGNESIUM: CPT

## 2019-12-11 PROCEDURE — 2580000003 HC RX 258: Performed by: EMERGENCY MEDICINE

## 2019-12-11 PROCEDURE — G0378 HOSPITAL OBSERVATION PER HR: HCPCS

## 2019-12-11 PROCEDURE — 84484 ASSAY OF TROPONIN QUANT: CPT

## 2019-12-11 PROCEDURE — 93005 ELECTROCARDIOGRAM TRACING: CPT | Performed by: EMERGENCY MEDICINE

## 2019-12-11 PROCEDURE — 81003 URINALYSIS AUTO W/O SCOPE: CPT

## 2019-12-11 PROCEDURE — 99285 EMERGENCY DEPT VISIT HI MDM: CPT

## 2019-12-11 PROCEDURE — 96360 HYDRATION IV INFUSION INIT: CPT

## 2019-12-11 RX ORDER — 0.9 % SODIUM CHLORIDE 0.9 %
1000 INTRAVENOUS SOLUTION INTRAVENOUS ONCE
Status: COMPLETED | OUTPATIENT
Start: 2019-12-11 | End: 2019-12-11

## 2019-12-11 RX ADMIN — SODIUM CHLORIDE 1000 ML: 9 INJECTION, SOLUTION INTRAVENOUS at 20:55

## 2019-12-12 VITALS
TEMPERATURE: 98.9 F | OXYGEN SATURATION: 92 % | RESPIRATION RATE: 17 BRPM | DIASTOLIC BLOOD PRESSURE: 74 MMHG | HEART RATE: 68 BPM | SYSTOLIC BLOOD PRESSURE: 164 MMHG | BODY MASS INDEX: 27.86 KG/M2 | WEIGHT: 177.91 LBS

## 2019-12-12 LAB
ANION GAP SERPL CALCULATED.3IONS-SCNC: 12 MMOL/L (ref 3–16)
BUN BLDV-MCNC: 13 MG/DL (ref 7–20)
CALCIUM SERPL-MCNC: 10 MG/DL (ref 8.3–10.6)
CHLORIDE BLD-SCNC: 102 MMOL/L (ref 99–110)
CO2: 25 MMOL/L (ref 21–32)
CREAT SERPL-MCNC: 0.8 MG/DL (ref 0.6–1.2)
EKG ATRIAL RATE: 62 BPM
EKG DIAGNOSIS: NORMAL
EKG P AXIS: -4 DEGREES
EKG P-R INTERVAL: 156 MS
EKG Q-T INTERVAL: 480 MS
EKG QRS DURATION: 148 MS
EKG QTC CALCULATION (BAZETT): 487 MS
EKG R AXIS: 8 DEGREES
EKG T AXIS: 239 DEGREES
EKG VENTRICULAR RATE: 62 BPM
GFR AFRICAN AMERICAN: >60
GFR NON-AFRICAN AMERICAN: >60
GLUCOSE BLD-MCNC: 104 MG/DL (ref 70–99)
HCT VFR BLD CALC: 45.4 % (ref 36–48)
HEMOGLOBIN: 15.6 G/DL (ref 12–16)
LV EF: 58 %
LVEF MODALITY: NORMAL
MAGNESIUM: 2.3 MG/DL (ref 1.8–2.4)
MCH RBC QN AUTO: 31.5 PG (ref 26–34)
MCHC RBC AUTO-ENTMCNC: 34.2 G/DL (ref 31–36)
MCV RBC AUTO: 91.9 FL (ref 80–100)
PDW BLD-RTO: 15.8 % (ref 12.4–15.4)
PLATELET # BLD: 230 K/UL (ref 135–450)
PMV BLD AUTO: 7.8 FL (ref 5–10.5)
POTASSIUM REFLEX MAGNESIUM: 3.3 MMOL/L (ref 3.5–5.1)
RBC # BLD: 4.94 M/UL (ref 4–5.2)
SODIUM BLD-SCNC: 139 MMOL/L (ref 136–145)
WBC # BLD: 8.3 K/UL (ref 4–11)

## 2019-12-12 PROCEDURE — 92610 EVALUATE SWALLOWING FUNCTION: CPT

## 2019-12-12 PROCEDURE — 36415 COLL VENOUS BLD VENIPUNCTURE: CPT

## 2019-12-12 PROCEDURE — 80048 BASIC METABOLIC PNL TOTAL CA: CPT

## 2019-12-12 PROCEDURE — 2580000003 HC RX 258: Performed by: INTERNAL MEDICINE

## 2019-12-12 PROCEDURE — 6360000002 HC RX W HCPCS: Performed by: NURSE PRACTITIONER

## 2019-12-12 PROCEDURE — 99205 OFFICE O/P NEW HI 60 MIN: CPT | Performed by: INTERNAL MEDICINE

## 2019-12-12 PROCEDURE — 6370000000 HC RX 637 (ALT 250 FOR IP): Performed by: INTERNAL MEDICINE

## 2019-12-12 PROCEDURE — 6370000000 HC RX 637 (ALT 250 FOR IP): Performed by: NURSE PRACTITIONER

## 2019-12-12 PROCEDURE — 2580000003 HC RX 258: Performed by: NURSE PRACTITIONER

## 2019-12-12 PROCEDURE — 93010 ELECTROCARDIOGRAM REPORT: CPT | Performed by: INTERNAL MEDICINE

## 2019-12-12 PROCEDURE — G0378 HOSPITAL OBSERVATION PER HR: HCPCS

## 2019-12-12 PROCEDURE — 6360000002 HC RX W HCPCS: Performed by: INTERNAL MEDICINE

## 2019-12-12 PROCEDURE — 96372 THER/PROPH/DIAG INJ SC/IM: CPT

## 2019-12-12 PROCEDURE — 93306 TTE W/DOPPLER COMPLETE: CPT

## 2019-12-12 PROCEDURE — 83735 ASSAY OF MAGNESIUM: CPT

## 2019-12-12 PROCEDURE — 95819 EEG AWAKE AND ASLEEP: CPT

## 2019-12-12 PROCEDURE — 94760 N-INVAS EAR/PLS OXIMETRY 1: CPT

## 2019-12-12 PROCEDURE — 85027 COMPLETE CBC AUTOMATED: CPT

## 2019-12-12 RX ORDER — LOSARTAN POTASSIUM 100 MG/1
1 TABLET ORAL DAILY
Status: DISCONTINUED | OUTPATIENT
Start: 2019-12-12 | End: 2019-12-12 | Stop reason: ALTCHOICE

## 2019-12-12 RX ORDER — TRIAMTERENE AND HYDROCHLOROTHIAZIDE 37.5; 25 MG/1; MG/1
1 TABLET ORAL DAILY
Status: DISCONTINUED | OUTPATIENT
Start: 2019-12-12 | End: 2019-12-12

## 2019-12-12 RX ORDER — DONEPEZIL HYDROCHLORIDE 5 MG/1
5 TABLET, FILM COATED ORAL NIGHTLY
COMMUNITY

## 2019-12-12 RX ORDER — POTASSIUM CHLORIDE 7.45 MG/ML
10 INJECTION INTRAVENOUS PRN
Status: DISCONTINUED | OUTPATIENT
Start: 2019-12-12 | End: 2019-12-12 | Stop reason: HOSPADM

## 2019-12-12 RX ORDER — SODIUM CHLORIDE 0.9 % (FLUSH) 0.9 %
10 SYRINGE (ML) INJECTION EVERY 12 HOURS SCHEDULED
Status: DISCONTINUED | OUTPATIENT
Start: 2019-12-12 | End: 2019-12-12 | Stop reason: HOSPADM

## 2019-12-12 RX ORDER — CARVEDILOL 25 MG/1
25 TABLET ORAL 2 TIMES DAILY WITH MEALS
Status: DISCONTINUED | OUTPATIENT
Start: 2019-12-12 | End: 2019-12-12

## 2019-12-12 RX ORDER — OLANZAPINE 10 MG/1
2.5 INJECTION, POWDER, LYOPHILIZED, FOR SOLUTION INTRAMUSCULAR ONCE
Status: DISCONTINUED | OUTPATIENT
Start: 2019-12-12 | End: 2019-12-12

## 2019-12-12 RX ORDER — CARVEDILOL 12.5 MG/1
12.5 TABLET ORAL 2 TIMES DAILY WITH MEALS
Qty: 60 TABLET | Refills: 1 | Status: SHIPPED | OUTPATIENT
Start: 2019-12-12

## 2019-12-12 RX ORDER — SODIUM CHLORIDE 0.9 % (FLUSH) 0.9 %
10 SYRINGE (ML) INJECTION PRN
Status: DISCONTINUED | OUTPATIENT
Start: 2019-12-12 | End: 2019-12-12 | Stop reason: HOSPADM

## 2019-12-12 RX ORDER — ONDANSETRON 2 MG/ML
4 INJECTION INTRAMUSCULAR; INTRAVENOUS EVERY 6 HOURS PRN
Status: DISCONTINUED | OUTPATIENT
Start: 2019-12-12 | End: 2019-12-12 | Stop reason: HOSPADM

## 2019-12-12 RX ORDER — HALOPERIDOL 5 MG/ML
5 INJECTION INTRAMUSCULAR ONCE
Status: DISCONTINUED | OUTPATIENT
Start: 2019-12-12 | End: 2019-12-12 | Stop reason: HOSPADM

## 2019-12-12 RX ORDER — LEVOTHYROXINE SODIUM 0.07 MG/1
75 TABLET ORAL DAILY
Status: DISCONTINUED | OUTPATIENT
Start: 2019-12-12 | End: 2019-12-12 | Stop reason: HOSPADM

## 2019-12-12 RX ORDER — SODIUM CHLORIDE 9 MG/ML
INJECTION, SOLUTION INTRAVENOUS CONTINUOUS
Status: DISCONTINUED | OUTPATIENT
Start: 2019-12-12 | End: 2019-12-12

## 2019-12-12 RX ORDER — ATORVASTATIN CALCIUM 40 MG/1
40 TABLET, FILM COATED ORAL NIGHTLY
Qty: 30 TABLET | Refills: 3 | Status: SHIPPED | OUTPATIENT
Start: 2019-12-12 | End: 2020-01-05

## 2019-12-12 RX ORDER — ACETAMINOPHEN 325 MG/1
650 TABLET ORAL EVERY 4 HOURS PRN
Status: DISCONTINUED | OUTPATIENT
Start: 2019-12-12 | End: 2019-12-12 | Stop reason: HOSPADM

## 2019-12-12 RX ORDER — POTASSIUM CHLORIDE 20 MEQ/1
40 TABLET, EXTENDED RELEASE ORAL PRN
Status: DISCONTINUED | OUTPATIENT
Start: 2019-12-12 | End: 2019-12-12 | Stop reason: HOSPADM

## 2019-12-12 RX ORDER — ASPIRIN 81 MG/1
81 TABLET ORAL DAILY
Qty: 30 TABLET | Refills: 1 | Status: SHIPPED | OUTPATIENT
Start: 2019-12-12

## 2019-12-12 RX ORDER — MAGNESIUM SULFATE 1 G/100ML
1 INJECTION INTRAVENOUS PRN
Status: DISCONTINUED | OUTPATIENT
Start: 2019-12-12 | End: 2019-12-12 | Stop reason: HOSPADM

## 2019-12-12 RX ORDER — ATORVASTATIN CALCIUM 40 MG/1
40 TABLET, FILM COATED ORAL NIGHTLY
Status: DISCONTINUED | OUTPATIENT
Start: 2019-12-12 | End: 2019-12-12 | Stop reason: HOSPADM

## 2019-12-12 RX ORDER — CARVEDILOL 12.5 MG/1
12.5 TABLET ORAL 2 TIMES DAILY WITH MEALS
Status: DISCONTINUED | OUTPATIENT
Start: 2019-12-12 | End: 2019-12-12 | Stop reason: HOSPADM

## 2019-12-12 RX ORDER — POTASSIUM CHLORIDE 20 MEQ/1
40 TABLET, EXTENDED RELEASE ORAL ONCE
Status: COMPLETED | OUTPATIENT
Start: 2019-12-12 | End: 2019-12-12

## 2019-12-12 RX ORDER — LORAZEPAM 2 MG/ML
1 INJECTION INTRAMUSCULAR ONCE
Status: DISCONTINUED | OUTPATIENT
Start: 2019-12-12 | End: 2019-12-12 | Stop reason: HOSPADM

## 2019-12-12 RX ORDER — ZIPRASIDONE MESYLATE 20 MG/ML
10 INJECTION, POWDER, LYOPHILIZED, FOR SOLUTION INTRAMUSCULAR ONCE
Status: COMPLETED | OUTPATIENT
Start: 2019-12-12 | End: 2019-12-12

## 2019-12-12 RX ADMIN — WATER 1.2 ML: 1 INJECTION INTRAMUSCULAR; INTRAVENOUS; SUBCUTANEOUS at 13:42

## 2019-12-12 RX ADMIN — CARVEDILOL 12.5 MG: 12.5 TABLET, FILM COATED ORAL at 18:00

## 2019-12-12 RX ADMIN — ZIPRASIDONE MESYLATE 10 MG: 20 INJECTION, POWDER, LYOPHILIZED, FOR SOLUTION INTRAMUSCULAR at 13:43

## 2019-12-12 RX ADMIN — ENOXAPARIN SODIUM 40 MG: 40 INJECTION SUBCUTANEOUS at 09:08

## 2019-12-12 RX ADMIN — CARVEDILOL 25 MG: 25 TABLET, FILM COATED ORAL at 09:16

## 2019-12-12 RX ADMIN — TRIAMTERENE AND HYDROCHLOROTHIAZIDE 1 TABLET: 37.5; 25 TABLET ORAL at 09:08

## 2019-12-12 RX ADMIN — LEVOTHYROXINE SODIUM 75 MCG: 75 TABLET ORAL at 06:31

## 2019-12-12 RX ADMIN — POTASSIUM CHLORIDE 40 MEQ: 1500 TABLET, EXTENDED RELEASE ORAL at 13:52

## 2019-12-12 RX ADMIN — SODIUM CHLORIDE: 9 INJECTION, SOLUTION INTRAVENOUS at 06:30

## 2019-12-12 RX ADMIN — POTASSIUM CHLORIDE 40 MEQ: 1500 TABLET, EXTENDED RELEASE ORAL at 06:30

## 2019-12-12 ASSESSMENT — PAIN SCALES - PAIN ASSESSMENT IN ADVANCED DEMENTIA (PAINAD)
BREATHING: 0
NEGVOCALIZATION: 0
CONSOLABILITY: 0
NEGVOCALIZATION: 0
FACIALEXPRESSION: 0
TOTALSCORE: 0
BREATHING: 0
NEGVOCALIZATION: 0
NEGVOCALIZATION: 0
FACIALEXPRESSION: 0
BREATHING: 0
FACIALEXPRESSION: 0
CONSOLABILITY: 0
CONSOLABILITY: 0
NEGVOCALIZATION: 0
FACIALEXPRESSION: 0
BODYLANGUAGE: 0
NEGVOCALIZATION: 0
TOTALSCORE: 0
BODYLANGUAGE: 0
BREATHING: 0
FACIALEXPRESSION: 0
BODYLANGUAGE: 0
TOTALSCORE: 0
BODYLANGUAGE: 0
BODYLANGUAGE: 0
CONSOLABILITY: 0
BREATHING: 0
TOTALSCORE: 0
TOTALSCORE: 0
CONSOLABILITY: 0
FACIALEXPRESSION: 0
TOTALSCORE: 0
BODYLANGUAGE: 0
BREATHING: 0
CONSOLABILITY: 0

## 2019-12-12 ASSESSMENT — PAIN SCALES - GENERAL
PAINLEVEL_OUTOF10: 0

## 2020-01-05 ENCOUNTER — APPOINTMENT (OUTPATIENT)
Dept: GENERAL RADIOLOGY | Age: 85
End: 2020-01-05
Payer: MEDICARE

## 2020-01-05 ENCOUNTER — HOSPITAL ENCOUNTER (EMERGENCY)
Age: 85
Discharge: HOME | End: 2020-01-05
Attending: EMERGENCY MEDICINE
Payer: MEDICARE

## 2020-01-05 VITALS
HEART RATE: 59 BPM | RESPIRATION RATE: 16 BRPM | BODY MASS INDEX: 26.83 KG/M2 | WEIGHT: 171.3 LBS | SYSTOLIC BLOOD PRESSURE: 142 MMHG | TEMPERATURE: 97.7 F | OXYGEN SATURATION: 95 % | DIASTOLIC BLOOD PRESSURE: 65 MMHG

## 2020-01-05 LAB
ANION GAP SERPL CALCULATED.3IONS-SCNC: 12 MMOL/L (ref 3–16)
BASOPHILS ABSOLUTE: 0 K/UL (ref 0–0.2)
BASOPHILS RELATIVE PERCENT: 0 %
BUN BLDV-MCNC: 19 MG/DL (ref 7–20)
CALCIUM SERPL-MCNC: 10.7 MG/DL (ref 8.3–10.6)
CHLORIDE BLD-SCNC: 103 MMOL/L (ref 99–110)
CO2: 28 MMOL/L (ref 21–32)
CREAT SERPL-MCNC: 1.1 MG/DL (ref 0.6–1.2)
EOSINOPHILS ABSOLUTE: 0.2 K/UL (ref 0–0.6)
EOSINOPHILS RELATIVE PERCENT: 2.2 %
GFR AFRICAN AMERICAN: 57
GFR NON-AFRICAN AMERICAN: 47
GLUCOSE BLD-MCNC: 113 MG/DL (ref 70–99)
HCT VFR BLD CALC: 50.7 % (ref 36–48)
HEMOGLOBIN: 16.9 G/DL (ref 12–16)
LYMPHOCYTES ABSOLUTE: 2.6 K/UL (ref 1–5.1)
LYMPHOCYTES RELATIVE PERCENT: 28.9 %
MCH RBC QN AUTO: 30.4 PG (ref 26–34)
MCHC RBC AUTO-ENTMCNC: 33.3 G/DL (ref 31–36)
MCV RBC AUTO: 91.2 FL (ref 80–100)
MONOCYTES ABSOLUTE: 0.7 K/UL (ref 0–1.3)
MONOCYTES RELATIVE PERCENT: 8.1 %
NEUTROPHILS ABSOLUTE: 5.6 K/UL (ref 1.7–7.7)
NEUTROPHILS RELATIVE PERCENT: 60.8 %
PDW BLD-RTO: 14.8 % (ref 12.4–15.4)
PLATELET # BLD: 289 K/UL (ref 135–450)
PMV BLD AUTO: 8.2 FL (ref 5–10.5)
POTASSIUM SERPL-SCNC: 4 MMOL/L (ref 3.5–5.1)
RBC # BLD: 5.56 M/UL (ref 4–5.2)
SODIUM BLD-SCNC: 143 MMOL/L (ref 136–145)
WBC # BLD: 9.1 K/UL (ref 4–11)

## 2020-01-05 PROCEDURE — 96360 HYDRATION IV INFUSION INIT: CPT

## 2020-01-05 PROCEDURE — 36415 COLL VENOUS BLD VENIPUNCTURE: CPT

## 2020-01-05 PROCEDURE — 99284 EMERGENCY DEPT VISIT MOD MDM: CPT

## 2020-01-05 PROCEDURE — 80048 BASIC METABOLIC PNL TOTAL CA: CPT

## 2020-01-05 PROCEDURE — 2580000003 HC RX 258: Performed by: EMERGENCY MEDICINE

## 2020-01-05 PROCEDURE — 74019 RADEX ABDOMEN 2 VIEWS: CPT

## 2020-01-05 PROCEDURE — 85025 COMPLETE CBC W/AUTO DIFF WBC: CPT

## 2020-01-05 RX ORDER — 0.9 % SODIUM CHLORIDE 0.9 %
1000 INTRAVENOUS SOLUTION INTRAVENOUS ONCE
Status: COMPLETED | OUTPATIENT
Start: 2020-01-05 | End: 2020-01-05

## 2020-01-05 RX ORDER — TRIAMTERENE AND HYDROCHLOROTHIAZIDE 37.5; 25 MG/1; MG/1
1 TABLET ORAL DAILY
COMMUNITY

## 2020-01-05 RX ORDER — NITROFURANTOIN 25; 75 MG/1; MG/1
100 CAPSULE ORAL 2 TIMES DAILY
COMMUNITY

## 2020-01-05 RX ORDER — QUETIAPINE FUMARATE 25 MG/1
25 TABLET, FILM COATED ORAL 2 TIMES DAILY
COMMUNITY

## 2020-01-05 RX ADMIN — SODIUM CHLORIDE 1000 ML: 9 INJECTION, SOLUTION INTRAVENOUS at 10:14

## 2020-01-05 NOTE — ED NOTES
Pt actually left unit at with her son to go home at 1130. Transferred pt to [facility] to complete charting on pt.       [address], RN  01/05/20 2042 [facility]  01/05/20 [address] [facility] [address] Candice M[place]  01/05/20 2840

## 2020-01-05 NOTE — ED NOTES
Diarrhea for 2 to 3 days. Son states that she has started Seroquel and he read that diarrhea is a side effect. Son was going to give her Immodium but wanted her checked out before giving it to her. No belly pain, no nausea or vomiting, no fevers.       [address], RN  01/05/20 2312

## 2020-01-05 NOTE — ED PROVIDER NOTES
[address] ENCOUNTER      Pt Name: Daniela Bautista  MRN: [id number]  [place] 1935  Date of evaluation: 1/5/2020  Provider: Herbert Gasca DO    CHIEF COMPLAINT  Chief Complaint   Patient presents with    Diarrhea     for past two days       HPI  Daniela Bautista is a 81 y.o. female who presents with watery diarrhea is been present for 2 to 3 days. She is had this in the past.  Her son states that it is watery and has some chunks in it. She did have a 20 times a day but only had it 3 times today. Patient is demented and has no complaints. He denies any fevers or chills. He states she is currently on antibiotics. She also recently started Seroquel which he states causes constipation on the insert. She also is currently on an antibiotic for urinary tract infection. They deny any other medication changes or new medications. Her son states that he was going to put her on Imodium right ear. However, he states last time he did that she became constipated and had diarrhea around the fecal impaction. ? REVIEW OF SYSTEMS    All systems negative except as noted in the HPI. Reviewed Nurses' notes and concur. History limited due to severe dementia. No LMP recorded (lmp unknown). Patient has had a hysterectomy. PAST MEDICAL HISTORY  Past Medical History:   Diagnosis Date    [facility] disease ([facility]     Cancer ([facility]     lymphoma    Dementia ([facility]     GERD (gastroesophageal reflux disease)     Hyperlipidemia     Hypertension     Hypothyroidism     Osteoarthritis        FAMILY HISTORY  Family History   Problem Relation Age of Onset    Cancer Mother         stomach,liver    Diabetes Mother     Heart Disease Sister     Osteoarthritis Daughter     Heart Disease Son        SOCIAL HISTORY   reports that she is a non-smoker but has been exposed to tobacco smoke.  She has never used smokeless tobacco. She reports that she does not drink alcohol or

## 2020-04-20 ENCOUNTER — LAB VISIT (OUTPATIENT)
Dept: LAB | Facility: HOSPITAL | Age: 85
End: 2020-04-20
Attending: INTERNAL MEDICINE
Payer: COMMERCIAL

## 2020-04-20 ENCOUNTER — OFFICE VISIT (OUTPATIENT)
Dept: INTERNAL MEDICINE | Facility: CLINIC | Age: 85
End: 2020-04-20
Payer: COMMERCIAL

## 2020-04-20 VITALS
DIASTOLIC BLOOD PRESSURE: 70 MMHG | HEIGHT: 65 IN | BODY MASS INDEX: 28.83 KG/M2 | SYSTOLIC BLOOD PRESSURE: 122 MMHG | RESPIRATION RATE: 15 BRPM | OXYGEN SATURATION: 98 % | TEMPERATURE: 98 F | WEIGHT: 173.06 LBS | HEART RATE: 67 BPM

## 2020-04-20 DIAGNOSIS — G30.9 ALZHEIMER'S DEMENTIA WITHOUT BEHAVIORAL DISTURBANCE, UNSPECIFIED TIMING OF DEMENTIA ONSET: ICD-10-CM

## 2020-04-20 DIAGNOSIS — I10 HYPERTENSION, UNSPECIFIED TYPE: ICD-10-CM

## 2020-04-20 DIAGNOSIS — G47.9 SLEEP DISORDER: ICD-10-CM

## 2020-04-20 DIAGNOSIS — Z00.00 ANNUAL PHYSICAL EXAM: Primary | ICD-10-CM

## 2020-04-20 DIAGNOSIS — E03.9 HYPOTHYROIDISM, UNSPECIFIED TYPE: ICD-10-CM

## 2020-04-20 DIAGNOSIS — K58.9 IRRITABLE BOWEL SYNDROME, UNSPECIFIED TYPE: ICD-10-CM

## 2020-04-20 DIAGNOSIS — F02.80 ALZHEIMER'S DEMENTIA WITHOUT BEHAVIORAL DISTURBANCE, UNSPECIFIED TIMING OF DEMENTIA ONSET: ICD-10-CM

## 2020-04-20 LAB
ALBUMIN SERPL BCP-MCNC: 3.9 G/DL (ref 3.5–5.2)
ALP SERPL-CCNC: 106 U/L (ref 55–135)
ALT SERPL W/O P-5'-P-CCNC: 18 U/L (ref 10–44)
ANION GAP SERPL CALC-SCNC: 10 MMOL/L (ref 8–16)
AST SERPL-CCNC: 22 U/L (ref 10–40)
BASOPHILS # BLD AUTO: 0.05 K/UL (ref 0–0.2)
BASOPHILS NFR BLD: 0.5 % (ref 0–1.9)
BILIRUB SERPL-MCNC: 0.3 MG/DL (ref 0.1–1)
BUN SERPL-MCNC: 9 MG/DL (ref 8–23)
CALCIUM SERPL-MCNC: 10.1 MG/DL (ref 8.7–10.5)
CHLORIDE SERPL-SCNC: 99 MMOL/L (ref 95–110)
CHOLEST SERPL-MCNC: 211 MG/DL (ref 120–199)
CHOLEST/HDLC SERPL: 4.8 {RATIO} (ref 2–5)
CO2 SERPL-SCNC: 26 MMOL/L (ref 23–29)
CREAT SERPL-MCNC: 0.9 MG/DL (ref 0.5–1.4)
DIFFERENTIAL METHOD: ABNORMAL
EOSINOPHIL # BLD AUTO: 0.1 K/UL (ref 0–0.5)
EOSINOPHIL NFR BLD: 1 % (ref 0–8)
ERYTHROCYTE [DISTWIDTH] IN BLOOD BY AUTOMATED COUNT: 14.9 % (ref 11.5–14.5)
EST. GFR  (AFRICAN AMERICAN): >60 ML/MIN/1.73 M^2
EST. GFR  (NON AFRICAN AMERICAN): 59.3 ML/MIN/1.73 M^2
GLUCOSE SERPL-MCNC: 100 MG/DL (ref 70–110)
HCT VFR BLD AUTO: 49.5 % (ref 37–48.5)
HDLC SERPL-MCNC: 44 MG/DL (ref 40–75)
HDLC SERPL: 20.9 % (ref 20–50)
HGB BLD-MCNC: 15.6 G/DL (ref 12–16)
IMM GRANULOCYTES # BLD AUTO: 0.05 K/UL (ref 0–0.04)
IMM GRANULOCYTES NFR BLD AUTO: 0.5 % (ref 0–0.5)
LDLC SERPL CALC-MCNC: 114.8 MG/DL (ref 63–159)
LYMPHOCYTES # BLD AUTO: 3.6 K/UL (ref 1–4.8)
LYMPHOCYTES NFR BLD: 36.7 % (ref 18–48)
MCH RBC QN AUTO: 30.5 PG (ref 27–31)
MCHC RBC AUTO-ENTMCNC: 31.5 G/DL (ref 32–36)
MCV RBC AUTO: 97 FL (ref 82–98)
MONOCYTES # BLD AUTO: 0.7 K/UL (ref 0.3–1)
MONOCYTES NFR BLD: 6.9 % (ref 4–15)
NEUTROPHILS # BLD AUTO: 5.3 K/UL (ref 1.8–7.7)
NEUTROPHILS NFR BLD: 54.4 % (ref 38–73)
NONHDLC SERPL-MCNC: 167 MG/DL
NRBC BLD-RTO: 0 /100 WBC
PLATELET # BLD AUTO: 288 K/UL (ref 150–350)
PMV BLD AUTO: 9.9 FL (ref 9.2–12.9)
POTASSIUM SERPL-SCNC: 4.1 MMOL/L (ref 3.5–5.1)
PROT SERPL-MCNC: 7 G/DL (ref 6–8.4)
RBC # BLD AUTO: 5.11 M/UL (ref 4–5.4)
SODIUM SERPL-SCNC: 135 MMOL/L (ref 136–145)
TRIGL SERPL-MCNC: 261 MG/DL (ref 30–150)
TSH SERPL DL<=0.005 MIU/L-ACNC: 1.93 UIU/ML (ref 0.4–4)
WBC # BLD AUTO: 9.79 K/UL (ref 3.9–12.7)

## 2020-04-20 PROCEDURE — 99999 PR PBB SHADOW E&M-NEW PATIENT-LVL IV: ICD-10-PCS | Mod: PBBFAC,,, | Performed by: STUDENT IN AN ORGANIZED HEALTH CARE EDUCATION/TRAINING PROGRAM

## 2020-04-20 PROCEDURE — 36415 COLL VENOUS BLD VENIPUNCTURE: CPT | Mod: PO

## 2020-04-20 PROCEDURE — 85025 COMPLETE CBC W/AUTO DIFF WBC: CPT

## 2020-04-20 PROCEDURE — 84443 ASSAY THYROID STIM HORMONE: CPT

## 2020-04-20 PROCEDURE — 99204 OFFICE O/P NEW MOD 45 MIN: CPT | Mod: PBBFAC,PO | Performed by: STUDENT IN AN ORGANIZED HEALTH CARE EDUCATION/TRAINING PROGRAM

## 2020-04-20 PROCEDURE — 99999 PR PBB SHADOW E&M-NEW PATIENT-LVL IV: CPT | Mod: PBBFAC,,, | Performed by: STUDENT IN AN ORGANIZED HEALTH CARE EDUCATION/TRAINING PROGRAM

## 2020-04-20 PROCEDURE — 80061 LIPID PANEL: CPT

## 2020-04-20 PROCEDURE — 99205 PR OFFICE/OUTPT VISIT, NEW, LEVL V, 60-74 MIN: ICD-10-PCS | Mod: S$GLB,,, | Performed by: STUDENT IN AN ORGANIZED HEALTH CARE EDUCATION/TRAINING PROGRAM

## 2020-04-20 PROCEDURE — 80053 COMPREHEN METABOLIC PANEL: CPT

## 2020-04-20 PROCEDURE — 99205 OFFICE O/P NEW HI 60 MIN: CPT | Mod: S$GLB,,, | Performed by: STUDENT IN AN ORGANIZED HEALTH CARE EDUCATION/TRAINING PROGRAM

## 2020-04-20 RX ORDER — CARVEDILOL 12.5 MG/1
12.5 TABLET ORAL 2 TIMES DAILY WITH MEALS
COMMUNITY
End: 2020-04-20 | Stop reason: SDUPTHER

## 2020-04-20 RX ORDER — QUETIAPINE FUMARATE 25 MG/1
25 TABLET, FILM COATED ORAL 2 TIMES DAILY
Qty: 60 TABLET | Refills: 9 | Status: SHIPPED | OUTPATIENT
Start: 2020-04-20 | End: 2020-06-04 | Stop reason: SDUPTHER

## 2020-04-20 RX ORDER — LEVOTHYROXINE SODIUM 75 UG/1
75 TABLET ORAL
COMMUNITY
End: 2020-04-20 | Stop reason: SDUPTHER

## 2020-04-20 RX ORDER — TRIAMTERENE AND HYDROCHLOROTHIAZIDE 37.5; 25 MG/1; MG/1
1 CAPSULE ORAL EVERY MORNING
Qty: 30 CAPSULE | Refills: 9 | Status: SHIPPED | OUTPATIENT
Start: 2020-04-20

## 2020-04-20 RX ORDER — LEVOTHYROXINE SODIUM 75 UG/1
75 TABLET ORAL
Qty: 30 TABLET | Refills: 9 | Status: SHIPPED | OUTPATIENT
Start: 2020-04-20

## 2020-04-20 RX ORDER — DONEPEZIL HYDROCHLORIDE 5 MG/1
5 TABLET, FILM COATED ORAL DAILY
Qty: 30 TABLET | Refills: 9 | Status: SHIPPED | OUTPATIENT
Start: 2020-04-20 | End: 2020-06-04

## 2020-04-20 RX ORDER — DONEPEZIL HYDROCHLORIDE 5 MG/1
5 TABLET, FILM COATED ORAL DAILY
COMMUNITY
End: 2020-04-20 | Stop reason: SDUPTHER

## 2020-04-20 RX ORDER — NITROFURANTOIN 25; 75 MG/1; MG/1
100 CAPSULE ORAL 2 TIMES DAILY
COMMUNITY

## 2020-04-20 RX ORDER — CARVEDILOL 12.5 MG/1
12.5 TABLET ORAL 2 TIMES DAILY WITH MEALS
Qty: 60 TABLET | Refills: 9 | Status: SHIPPED | OUTPATIENT
Start: 2020-04-20

## 2020-04-20 RX ORDER — QUETIAPINE FUMARATE 25 MG/1
TABLET, FILM COATED ORAL
COMMUNITY
End: 2020-04-20 | Stop reason: SDUPTHER

## 2020-04-20 RX ORDER — TRIAMTERENE AND HYDROCHLOROTHIAZIDE 37.5; 25 MG/1; MG/1
1 CAPSULE ORAL EVERY MORNING
COMMUNITY
End: 2020-04-20 | Stop reason: SDUPTHER

## 2020-04-20 NOTE — PROGRESS NOTES
Clinic Note  4/20/2020      Subjective:       Patient ID:  Alejandrina is a 84 y.o. female being seen for an established visit.    Chief Complaint: Establish Care    Alejandrina Bee is an 84 year old female with Alzheimer's, hypertension, IBS, and hypothyroidism who presents to establish care.     Hypertension stable and controlled with Coreg and Dyazide.   Alzeihmer's controlled with Aricept.   Sleep and anxiety not as well controlled with Seroquel, pt was living w/ her son in [place] who just d. MI and she has moved to the area to live w/ her daughter..   IBS stable and controlled with Linzess.     Cholesterol: ordered  Vaccines: daughter will obtain records from her prior PCP in [place]   Colonoscopy: will obtain records from her prior PCP in [place]   DEXA: will obtain prior records  Activities of daily living: not active, daughter takes care of her  Advance Directives: not at this time. Moved in with daughter a month ago.   Mental health: has had some anxiety and depression since the recent death of her son  Falls: no  Urinary incontinence: no  Safety: good, daughter takes care of patient full time   Nutrition: good      Patient is present with her daughter, Jade Ricci.         Review of Systems   Constitutional: Negative for chills and fever.   HENT: Negative for congestion and sore throat.    Eyes: Negative for blurred vision, double vision and photophobia.   Respiratory: Negative for cough, sputum production, shortness of breath and wheezing.    Cardiovascular: Negative for chest pain, palpitations and leg swelling.   Gastrointestinal: Negative for diarrhea, melena, nausea and vomiting.   Genitourinary: Negative for dysuria, flank pain and urgency.   Musculoskeletal: Negative for falls, joint pain and myalgias.   Skin: Negative for itching and rash.   Neurological: Negative for tremors, weakness and headaches.   Psychiatric/Behavioral: Positive for memory loss. Negative for substance abuse and suicidal ideas. The  "patient is nervous/anxious and has insomnia.        Past Medical History:   Diagnosis Date    Alzheimer disease     Cervical cancer     Hypertension     Hypothyroidism     IBS (irritable bowel syndrome)     IBS (irritable bowel syndrome)     Lymphoma        Family History   Problem Relation Age of Onset    Cancer Mother     Anxiety disorder Daughter     Cancer Daughter     Heart attack Son         reports that she has never smoked. She does not have any smokeless tobacco history on file. She reports that she does not drink alcohol or use drugs.    Medication List with Changes/Refills   Current Medications    CARVEDILOL (COREG) 12.5 MG TABLET    Take 12.5 mg by mouth 2 (two) times daily with meals.    DONEPEZIL (ARICEPT) 5 MG TABLET    Take 5 mg by mouth once daily.    LEVOTHYROXINE (SYNTHROID) 75 MCG TABLET    Take 75 mcg by mouth before breakfast.    LINACLOTIDE (LINZESS) 145 MCG CAP CAPSULE    Take 145 mcg by mouth once daily.    NITROFURANTOIN, MACROCRYSTAL-MONOHYDRATE, (MACROBID) 100 MG CAPSULE    Take 100 mg by mouth 2 (two) times daily.    QUETIAPINE (SEROQUEL) 25 MG TAB    Take by mouth.    TRIAMTERENE-HYDROCHLOROTHIAZIDE 37.5-25 MG (DYAZIDE) 37.5-25 MG PER CAPSULE    Take 1 capsule by mouth every morning.     Review of patient's allergies indicates:  No Known Allergies    Patient Active Problem List   Diagnosis    Annual physical exam    Alzheimer's dementia without behavioral disturbance    Hypertension    Sleep disorder           Objective:      /70 (BP Location: Left arm, Patient Position: Sitting, BP Method: Large (Manual))   Pulse 67   Temp 98.1 °F (36.7 °C) (Temporal)   Resp 15   Ht 5' 5" (1.651 m)   Wt 78.5 kg (173 lb 1 oz)   SpO2 98%   BMI 28.80 kg/m²   Estimated body mass index is 28.8 kg/m² as calculated from the following:    Height as of this encounter: 5' 5" (1.651 m).    Weight as of this encounter: 78.5 kg (173 lb 1 oz).      Physical Exam   Constitutional: She is " oriented to person, place, and time and well-developed, well-nourished, and in no distress.   HENT:   Head: Normocephalic and atraumatic.   Eyes: EOM are normal. Right eye exhibits no discharge. Left eye exhibits no discharge. No scleral icterus.   Neck: Normal range of motion. Neck supple.   Cardiovascular: Normal rate, regular rhythm and normal heart sounds.   Pulmonary/Chest: Effort normal and breath sounds normal. No respiratory distress.   Abdominal: Soft. There is no tenderness.   Musculoskeletal: Normal range of motion. She exhibits no edema.   Neurological: She is alert and oriented to person, place, and time.   Skin: Skin is warm and dry. She is not diaphoretic.   Nursing note and vitals reviewed.        Assessment and Plan:         Patient was seen today for establish care.    Diagnoses and all orders for this visit:    Annual physical exam  - routine labs and UA ordered     Alzheimer's dementia without behavioral disturbance, unspecified timing of dementia onset  -     Ambulatory referral/consult to Neurology; Future  -     donepeziL (ARICEPT) 5 MG tablet; Take 1 tablet (5 mg total) by mouth once daily.    Hypertension, unspecified type  -     CBC auto differential; Future  -     Comprehensive metabolic panel; Future  -     Cancel: URINALYSIS  -     Lipid panel; Future  -     URINALYSIS; Future  -     carvediloL (COREG) 12.5 MG tablet; Take 1 tablet (12.5 mg total) by mouth 2 (two) times daily with meals.  -     triamterene-hydrochlorothiazide 37.5-25 mg (DYAZIDE) 37.5-25 mg per capsule; Take 1 capsule by mouth every morning.  -     Controlled, continue Coreg and Dyazide     Sleep disorder  -     QUEtiapine (SEROQUEL) 25 MG Tab; Take 1 tablet (25 mg total) by mouth 2 (two) times daily.  -     Seroquel increased from daily to BID to help with anxiety     Hypothyroidism, unspecified type  -     TSH; Future  -     levothyroxine (SYNTHROID) 75 MCG tablet; Take 1 tablet (75 mcg total) by mouth before  breakfast.    Irritable bowel syndrome, unspecified type  -     Controlled, continue linaCLOtide (LINZESS) 145 mcg Cap capsule; Take 1 capsule (145 mcg total) by mouth once daily.            Other Orders Placed This Visit:  Orders Placed This Encounter   Procedures    CBC auto differential    Comprehensive metabolic panel    Lipid panel    TSH    URINALYSIS    Ambulatory referral/consult to Neurology         Greater than 45 minutes was spent with patient in review of her past medical history and her medications.     Interview, Assessment, Findings, and Plan discussed with Dr. Downey.     Follow up in about 5 weeks (around 5/25/2020), or if symptoms worsen or fail to improve.      Godwin Lo MD  Internal Medicine

## 2020-04-20 NOTE — PROGRESS NOTES
I have interviewed and examined the patient w/ the resident,   I agree w/ the impression and plan as outlined above.   Sravani Downey MD - Staff

## 2020-04-21 ENCOUNTER — TELEPHONE (OUTPATIENT)
Dept: INTERNAL MEDICINE | Facility: CLINIC | Age: 85
End: 2020-04-21

## 2020-04-29 ENCOUNTER — TELEPHONE (OUTPATIENT)
Dept: INTERNAL MEDICINE | Facility: CLINIC | Age: 85
End: 2020-04-29

## 2020-04-29 NOTE — TELEPHONE ENCOUNTER
----- Message from Maryellen Sullivan MA sent at 4/29/2020  1:56 PM CDT -----  Contact: Patient [phone]      ----- Message -----  From: Riley Trujillo  Sent: 4/29/2020   1:32 PM CDT  To: [facility] Internal Medicine Residents    Patient would like to get Rx advice.     Comments: Patient calling stating has question regarding Rx's and how to use and which one to double up on, will discuss further with call back.    Attn: Wally Connelly    Please call an advise  Thank you

## 2020-04-29 NOTE — TELEPHONE ENCOUNTER
Dr. Lo can you please call this patient. The patient is requesting a call.  I tried reaching the patient but no answer at that time.

## 2020-05-04 NOTE — TELEPHONE ENCOUNTER
I called the patient and no answer. Will try back later.    Dr. Lo have you called this patient to answer their questions?

## 2020-05-05 ENCOUNTER — TELEPHONE (OUTPATIENT)
Dept: HEPATOLOGY | Facility: HOSPITAL | Age: 85
End: 2020-05-05

## 2020-05-05 DIAGNOSIS — F32.A DEPRESSION, UNSPECIFIED DEPRESSION TYPE: Primary | ICD-10-CM

## 2020-05-05 NOTE — TELEPHONE ENCOUNTER
Called and spoke to patient's daughter, Jade Ricci. Ms. Ricci says her mother is becoming more difficult to handle and is having bad depression. She is asking for a Psychiatry referral for her mother. Psychiatry referral has been placed. Patient has yet to follow-up with Neurology. I encouraged the daughter to make an appointment with me if she chooses.

## 2020-05-07 ENCOUNTER — TELEPHONE (OUTPATIENT)
Dept: INTERNAL MEDICINE | Facility: CLINIC | Age: 85
End: 2020-05-07

## 2020-05-07 NOTE — TELEPHONE ENCOUNTER
----- Message from Deidra Lovelace sent at 5/7/2020  1:23 PM CDT -----  Pt is requesting a call back to speak with nurse about getting a in office appointment for a ear ache..      Please call and advise          Thank you

## 2020-05-29 ENCOUNTER — OFFICE VISIT (OUTPATIENT)
Dept: INTERNAL MEDICINE | Facility: CLINIC | Age: 85
End: 2020-05-29
Payer: MEDICARE

## 2020-05-29 VITALS
SYSTOLIC BLOOD PRESSURE: 140 MMHG | TEMPERATURE: 98 F | WEIGHT: 170.44 LBS | DIASTOLIC BLOOD PRESSURE: 80 MMHG | BODY MASS INDEX: 28.4 KG/M2 | HEIGHT: 65 IN | RESPIRATION RATE: 17 BRPM

## 2020-05-29 DIAGNOSIS — M19.041 PRIMARY OSTEOARTHRITIS OF BOTH HANDS: ICD-10-CM

## 2020-05-29 DIAGNOSIS — M19.042 PRIMARY OSTEOARTHRITIS OF BOTH HANDS: ICD-10-CM

## 2020-05-29 DIAGNOSIS — G30.9 ALZHEIMER'S DEMENTIA WITHOUT BEHAVIORAL DISTURBANCE, UNSPECIFIED TIMING OF DEMENTIA ONSET: Primary | ICD-10-CM

## 2020-05-29 DIAGNOSIS — F02.80 ALZHEIMER'S DEMENTIA WITHOUT BEHAVIORAL DISTURBANCE, UNSPECIFIED TIMING OF DEMENTIA ONSET: Primary | ICD-10-CM

## 2020-05-29 DIAGNOSIS — I10 HYPERTENSION, UNSPECIFIED TYPE: ICD-10-CM

## 2020-05-29 DIAGNOSIS — E03.9 HYPOTHYROIDISM, UNSPECIFIED TYPE: ICD-10-CM

## 2020-05-29 PROCEDURE — 99999 PR PBB SHADOW E&M-EST. PATIENT-LVL III: CPT | Mod: PBBFAC,,, | Performed by: INTERNAL MEDICINE

## 2020-05-29 PROCEDURE — 99999 PR PBB SHADOW E&M-EST. PATIENT-LVL III: ICD-10-PCS | Mod: PBBFAC,,, | Performed by: INTERNAL MEDICINE

## 2020-05-29 PROCEDURE — 99214 PR OFFICE/OUTPT VISIT, EST, LEVL IV, 30-39 MIN: ICD-10-PCS | Mod: S$PBB,,, | Performed by: INTERNAL MEDICINE

## 2020-05-29 PROCEDURE — 99214 OFFICE O/P EST MOD 30 MIN: CPT | Mod: S$PBB,,, | Performed by: INTERNAL MEDICINE

## 2020-05-29 PROCEDURE — 99213 OFFICE O/P EST LOW 20 MIN: CPT | Mod: PBBFAC,PO | Performed by: INTERNAL MEDICINE

## 2020-05-29 RX ORDER — TRAZODONE HYDROCHLORIDE 50 MG/1
50 TABLET ORAL NIGHTLY PRN
Qty: 30 TABLET | Refills: 3 | Status: SHIPPED | OUTPATIENT
Start: 2020-05-29 | End: 2020-06-04 | Stop reason: SDUPTHER

## 2020-05-29 RX ORDER — MUPIROCIN 20 MG/G
OINTMENT TOPICAL 3 TIMES DAILY
Qty: 22 G | Refills: 0 | Status: SHIPPED | OUTPATIENT
Start: 2020-05-29 | End: 2020-07-18
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
## 2020-06-01 ENCOUNTER — OUTPATIENT CASE MANAGEMENT (OUTPATIENT)
Dept: ADMINISTRATIVE | Facility: OTHER | Age: 85
End: 2020-06-01

## 2020-06-01 NOTE — PROGRESS NOTES
Outpatient Care Management   - Low Risk Patient Assessment    Patient: Alejandrina Bee  MRN:  [id number]  Date of Service:  6/1/2020  Completed by:  Jany Velázquez LMSW  Referral Date: 05/29/2020  Program: OPCM Low Risk    Reason for Visit   Patient presents with    Social Work Assessment - Low/Mod Risk     06/01/2020    Case Closure     06/01/2020       Patient Summary     OPCM Social Work Assessment (Low/Moderate Risk)    General  Level of Caregiver support:  Caregiver currently provides assistance  Have you had to make a decision between paying for any of the following in the last 2 months?:  Shelter, Food  Transportation means:  Family  Employment status:  Not employed  Do you have any of the following?:  Medical power of   Current symptoms:  Sleep disturbances, Confusion, Memory problems, Difficulty processing information, Hallucinations, Delusions, Restlessness  Assessments  Was the PHQ Depression Screening completed this visit?:  No  Was the LAURA-7 Screening completed this visit?:  No    This LMSW received a referral on the above patient.   Reason for referral:Alzheimer's dementia without behavioral disturbance, unspecified timing of dementia onset  Name of the community resource that was provided:[employer], Alzheimer's 24/7 help-line, Sitter List, [employer] Long Term Phone Number, [employer] Transportation, Medicaid Transportation, Affordable Housing List, [facility]   Resource given to: Patient Daughter via Telephone and e-mail       LMSW completed assessment with patient daughter. Daughter reports she and patient resides together and her daughter home. Daughter/Caregiver seeking affordable housing for she and patient as they are sharing a room. LMSW provided daughter with a list of affordable housing in the [facility]. Daughter reports she assist patient with ADL'S. Daughter denies patient uses any assisted devices to ambulate. Daughter denies patient needs assistance with food,  medication and or medical. LMSW provided housing resources as requested. Daughter provides transportation to and from appointments. Daughter open to receiving additional resources for transportation. LMSW provided daughter with [employer] and [employer] transportation.  Daughter reports she is patient MPOA. Daughter reports patient has difficulty sleeping. Daughter reports patient has been having a hard time since she moved from [place]. Daughter reports patient was residing with her brother who suddenly passed away from a heart attack. Daughter reports patient has a scheduled visit with a psychiatrist for med mgmt. LMSW ask patient about her additional support system. Daughter reports she sees a Psychiatrist and has an appointment scheduled with a therapist. LMSW provided daughter with additional resources such as Alzheimers 24/7 helpline. LMSW also provided daughter with information on [employer] [facility]. This program can assist with respite are etc. LMSW provided daughter with infromation on how to apply for [employer]. [employer] can assist with a PCA in the home. LMSW provided a list of sitter services while awaiting decision for Long Term Medicaid. Daughter reports NH Placement will not be an option if she is able to take care of patient. Daughter unsure if patient had a spouse that served in the . Daughter reports she was not raised by mother. Daughter reports her great aunt raised her, so she is unsure. [facility] [place] [place] provided all resource via e-mail as requested to [email or web address]             Complex Care Plan     Care plan was discussed and completed today with input from patient and/or caregiver.    Goals    None         Patient Instructions     No follow-ups on file.    Todays OPCM Self-Management Care Plan was developed with the patients/caregivers input and was based on identified barriers from todays assessment.  Goals were written today with the patient/caregiver and  the patient has agreed to work towards these goals to improve his/her overall well-being. Patient verbalized understanding of the care plan, goals, and all of today's instructions. Encouraged patient/caregiver to communicate with his/her physician and health care team about health conditions and the treatment plan.  Provided my contact information today and encouraged patient/caregiver to call me with any questions as needed.

## 2020-06-04 ENCOUNTER — CLINICAL SUPPORT (OUTPATIENT)
Dept: PSYCHIATRY | Facility: CLINIC | Age: 85
End: 2020-06-04
Payer: MEDICARE

## 2020-06-04 DIAGNOSIS — F32.A DEPRESSION, UNSPECIFIED DEPRESSION TYPE: ICD-10-CM

## 2020-06-04 DIAGNOSIS — G47.9 SLEEP DISORDER: ICD-10-CM

## 2020-06-04 DIAGNOSIS — F03.91 MAJOR NEUROCOGNITIVE DISORDER DUE TO ALZHEIMER'S DISEASE, PROBABLE, WITH BEHAVIORAL DISTURBANCE: Primary | ICD-10-CM

## 2020-06-04 DIAGNOSIS — G30.9 ALZHEIMER'S DEMENTIA WITHOUT BEHAVIORAL DISTURBANCE, UNSPECIFIED TIMING OF DEMENTIA ONSET: ICD-10-CM

## 2020-06-04 DIAGNOSIS — F02.80 ALZHEIMER'S DEMENTIA WITHOUT BEHAVIORAL DISTURBANCE, UNSPECIFIED TIMING OF DEMENTIA ONSET: ICD-10-CM

## 2020-06-04 PROCEDURE — 99203 PR OFFICE/OUTPT VISIT, NEW, LEVL III, 30-44 MIN: ICD-10-PCS | Mod: 95,,, | Performed by: PSYCHIATRY & NEUROLOGY

## 2020-06-04 PROCEDURE — 99203 OFFICE O/P NEW LOW 30 MIN: CPT | Mod: 95,,, | Performed by: PSYCHIATRY & NEUROLOGY

## 2020-06-04 RX ORDER — QUETIAPINE FUMARATE 25 MG/1
25 TABLET, FILM COATED ORAL 2 TIMES DAILY
Qty: 60 TABLET | Refills: 0 | Status: SHIPPED | OUTPATIENT
Start: 2020-06-04 | End: 2020-08-04 | Stop reason: DRUGHIGH

## 2020-06-04 RX ORDER — SERTRALINE HYDROCHLORIDE 50 MG/1
TABLET, FILM COATED ORAL
Qty: 60 TABLET | Refills: 0 | Status: SHIPPED | OUTPATIENT
Start: 2020-06-04 | End: 2020-07-17

## 2020-06-04 RX ORDER — TRAZODONE HYDROCHLORIDE 50 MG/1
50 TABLET ORAL NIGHTLY PRN
Qty: 30 TABLET | Refills: 0 | Status: SHIPPED | OUTPATIENT
Start: 2020-06-04 | End: 2020-08-04 | Stop reason: DRUGHIGH

## 2020-06-04 RX ORDER — DONEPEZIL HYDROCHLORIDE 5 MG/1
10 TABLET, FILM COATED ORAL DAILY
Qty: 30 TABLET | Refills: 0 | Status: SHIPPED | OUTPATIENT
Start: 2020-06-04

## 2020-06-04 NOTE — PROGRESS NOTES
"OUTPATIENT PSYCHIATRY INITIAL VISIT    ENCOUNTER DATE:  6/4/2020  SITE:  [facility]  REFFERAL SOURCE:  Godwin Cheek DPM  LENGTH OF SESSION:  60 minutes        CHIEF COMPLAINT:   Dementia, initial visit    HISTORY OF PRESENTING ILLNESS:  Alejandrina Bee is a 85 y.o. female with history of Dementia who presents for initial assessment.    TELE PSYCHIATRY   Disclaimer   *The patient was informed despite using HIPPA compliant technology there may be risks including security breach, technological failure, inability to perform a comprehensive physical exam which could delay or prevent an accurate diagnosis, and potential complications from treatment decisions rendered over a telemedical platform. The patient understands and consented to the use of tele-health service as being a safe measure to mitigate during COVID Crisis.  The patient was also informed of the relationship between the physician and patient and the respective role of any other health care provider with respect to management of the patient; and notified that the pt may decline to receive medical services by telemedicine and may withdraw from such care at any time.     Patient's Current location, exact physical address: [address] [place] [address]  In Case of Emergency pts next of kin  Name: Jade Ricci  Phone number:  [phone]  Visit type: Virtual visit with synchronous audio and video  Total time spent with patient: 60 minutes        History as told by Patient - & or family/friend/spouse/caregiver with pts permission  Daughter and graddaughter present for interview. Pt has difficulty hearing and comprehending. Oriented to self and birthdate. Not oriented to place, time, or situation. Pt with disorganized speech, makes several nonsensical comments like "I don't think I see him," "can I take a ride," "let me go see," and "we're going up there anyway." Pt says "I have to go to the bathroom" and gets up " "to leave. Daughter follows and pt actually went to bedroom to lay down.    Collateral, obtained from daughter and grand-daughter:  Per daughter, pt's son  suddenly of MI in March. Pt was living with him for 8-10 years in [place]. Daughter went to [place] to stay with mother for a few weeks, then brought mother back to [place]. Daughter was not raised by pt because pt had daughter when she was very young. However, daughter has kept in touch with mother and brother over the years. Says she would write her mother, but only saw her once a year. Daughter spoke with son several times a week prior to his death. Son took over pt's finances and took pt's car keys 8 years ago. Pt's cognition has been progressively declining since then. Memory and cognition has been getting much worse over the past 5 years. Has not been able to recognize daughter or granddaughter for at least the past 2 years. Pt paces in house, wanders outside, and is often delusional and aggressive. Delusions about being attacked and raped by multiple men and meeting up with  relatives (mother and son). Pt lives in home with daughter, granddaughter and 4 great-grandchildren (7, 16, 17, 18). Pt occasionally aggressive to great-granddaughter (7), slapping at her, most recently as last night, but never anything serious. Granddaughter says "you have have to watch her ." At night she will walk out into yard or down street. Has taken their keys, sat in their car and said "I want to go home." Pt gets anxious when daughter or anyone leaves. Seen RIS (talking to self) and repeats words frequently. Pt often asks where son, Bong is, even though he  in March. Pt gets depressed per daughter and will only want to sleep for days on end. Does have good appetite usually. Can dress herself, but needs help getting clothing from daughter. Daughter says pt wears disposable adult underwear, is not incontinent, but did have an accident last week. Grand-daughter " "says daughter is minimizing and pt is incontinent at least once a week and needs help using the restroom, otherwise she makes a mess. Family would like something to help with pt's aggression.      Pt has been on Trazodone, Seroquel, and Aricept since she was in [place], but unsure how often she was getting meds as they believe caregiver was incompetant. Daughter says only thing that helps with aggression is Trazodone, which helps minimally. Pt has no h/o CVA or other vascular disease and "overall my mother is in good physical health." They've tried getting pt home-health or other help, but haven't been able to do so because of the pandemic. Daughter is adamantly opposed to NH placement at all costs.      PSYCHIATRIC REVIEW OF SYMPTOMS: (Is patient experiencing or having changes in any of the following?)    Unable to fully assess d/t impaired cognition, but daughter provides details when able:    Symptoms of Depression:   Pt never explicitly says she's depressed, but lays down most of the day. Also wakes up multiple times in the middle of the night. Pt cries often when she realizes son is dead.    Symptoms of Anxiety:   Often anxious per daughter, especially when someone leaves the house    Symptoms of Panic Attacks:  Denies    Symptoms of andre or hypomania:   Daughter denies    Symptoms of psychosis:   Details in HPI, seen RIS (talking to self), delusions about rape,  family members    Symptoms of PTSD:   Daughter denies    Sleep:   Overall poor, Trazodone helps minimally, but wakes up several times a night and sleeps most of the day      Other Psych ROS:    Symptoms of OCD:   Daughter denies    Symptoms of Eating Disorders:   Usually eats, but decreases consumption when depressed    Symptoms of ADHD:   Daughter denies    Risk Parameters:  Daughter says pt has never tried to hurt herself or made threats of harm to herself or others, but can occasionally get aggressive with great-granddaughter. Emphasized " keeping them  from one another    PSYCHIATRIC MED REVIEW    Current psych meds: Aricept 5mg daily, Seroquel 25mg BID, Trazodone 50mg qhs PRN insomnia  Medication side effects:  Daughter denies  Medication compliance:  Good since March    Previous psych meds trials  None per daughter    Information provided by daughter to best of her ability:    PAST PSYCHIATRIC HISTORY:  Previous Psychiatric Diagnoses:  Dementia  Previous Psychiatric Hospitalizations:  no   Previous SI/HI:  no  Previous Suicide Attempts:  n/a  Previous Self injurious behaviors: no  Previous Medication Trials:  Aricept, seroquel, Trazodone  Psychiatric Care (current & past):  unclear  History of Psychotherapy:  unclear  History of Violence:  no    SUBSTANCE ABUSE HISTORY:  Caffeine: no  Tobacco:  no  Alcohol:  no  Illicit Substances:  no  Misuse of Prescription Medications:  no  Other: Herbal supplements/ online supplements: Centrum    If positve history  Detoxes:  no  Rehabs:  no  12 Step Meetings:  no  Periods of Sobriety:  no  Withdrawal:  no    FAMILY HISTORY:  Psychiatric:  Sister- schizophrenia  Family H/o suicide:  no    SOCIAL HISTORY:  Developmental/Childhood:unknown  Education:unknown  Employment Status/Finances:Retired, owned an antique store  Relationship Status/Sexual Orientation:   and   10 years ago  Children: 2  Housing Status: lives with daughter, grand-daughter, 4 great-grandchildren    history:  NO  Access to Firearms: NO;  Locked up? N/A  Worship:was Islam, not very Buddhism now  Recreational activities:loves to walk, walks one block    LEGAL HISTORY:   Past charges/incarcerations: No   Pending charges:No     NEUROLOGIC HISTORY:  Seizures:  no   Head trauma:  no    MEDICAL REVIEW OF SYSTEMS:  Unable to assess    MEDICAL HISTORY:  Past Medical History:   Diagnosis Date    Alzheimer disease     Cervical cancer     Hypertension     Hypothyroidism     IBS (irritable bowel syndrome)      IBS (irritable bowel syndrome)     Lymphoma        ALL MEDICATIONS:    Current Outpatient Medications:     carvediloL (COREG) 12.5 MG tablet, Take 1 tablet (12.5 mg total) by mouth 2 (two) times daily with meals., Disp: 60 tablet, Rfl: 9    donepeziL (ARICEPT) 5 MG tablet, Take 1 tablet (5 mg total) by mouth once daily., Disp: 30 tablet, Rfl: 9    levothyroxine (SYNTHROID) 75 MCG tablet, Take 1 tablet (75 mcg total) by mouth before breakfast., Disp: 30 tablet, Rfl: 9    linaCLOtide (LINZESS) 145 mcg Cap capsule, Take 1 capsule (145 mcg total) by mouth once daily., Disp: 30 capsule, Rfl: 9    mupirocin (BACTROBAN) 2 % ointment, Apply topically 3 (three) times daily., Disp: 22 g, Rfl: 0    nitrofurantoin, macrocrystal-monohydrate, (MACROBID) 100 MG capsule, Take 100 mg by mouth 2 (two) times daily., Disp: , Rfl:     QUEtiapine (SEROQUEL) 25 MG Tab, Take 1 tablet (25 mg total) by mouth 2 (two) times daily., Disp: 60 tablet, Rfl: 9    traZODone (DESYREL) 50 MG tablet, Take 1 tablet (50 mg total) by mouth nightly as needed for Insomnia., Disp: 30 tablet, Rfl: 3    triamterene-hydrochlorothiazide 37.5-25 mg (DYAZIDE) 37.5-25 mg per capsule, Take 1 capsule by mouth every morning., Disp: 30 capsule, Rfl: 9    ALLERGIES:  Review of patient's allergies indicates:  No Known Allergies    RELEVANT LABS/STUDIES:    Lab Results   Component Value Date    WBC 9.79 04/20/2020    HGB 15.6 04/20/2020    HCT 49.5 (H) 04/20/2020    MCV 97 04/20/2020     04/20/2020     BMP  Lab Results   Component Value Date     (L) 04/20/2020    K 4.1 04/20/2020    CL 99 04/20/2020    CO2 26 04/20/2020    BUN 9 04/20/2020    CREATININE 0.9 04/20/2020    CALCIUM 10.1 04/20/2020    ANIONGAP 10 04/20/2020    ESTGFRAFRICA >60.0 04/20/2020    EGFRNONAA 59.3 (A) 04/20/2020     Lab Results   Component Value Date    ALT 18 04/20/2020    AST 22 04/20/2020    ALKPHOS 106 04/20/2020    BILITOT 0.3 04/20/2020     Lab Results   Component Value  Date    TSH 1.925 04/20/2020     No results found for: LABA1C, HGBA1C      VITALS  There were no vitals filed for this visit.    PHYSICAL EXAM  General: well developed, well nourished  Neurologic:   Gait: Normal (seen walking over video)  Psychomotor signs:  No involuntary movements or tremor  AIMS: none    PSYCHIATRIC EXAM:    Mental Status Exam:  Appearance: unremarkable, age appropriate  Behavior/Cooperation: limited d/t cognition, uncooperative, restless and fidgety   Speech:  normal tone, normal rate, normal pitch, normal volume  Language: fluent english  Mood: fine  Affect:  Euthymic, mood-congruent  Thought Process: loose associations, disorganized  Thought Content: unable to assess  Level of Consciousness: Alert and Oriented to person and birthdate, does not respond to other orientation questions  Memory:  Unable to assess, but notably impaired  Attention/concentration: easily distracted   Fund of Knowledge:  impaired  Insight:  Impaired/  Limited  Judgment: Impaired      IMPRESSION:    Alejandrina Bee is a 85 y.o. female with history of Dementia who presents for initial assessment.    DIAGNOSES:    ICD-10-CM ICD-9-CM   1. Major neurocognitive disorder due to Alzheimer's disease, probable, with behavioral disturbance G30.9 331.0    F02.81 294.9   2. Depression, unspecified depression type F32.9 311   3. Alzheimer's dementia without behavioral disturbance, unspecified timing of dementia onset G30.9 331.0    F02.80 294.10   4. Sleep disorder G47.9 780.50       PLAN:  Psych Med:   Continue Seroquel 25mg BID, Trazodone 50mg qhs PRN for insmonia   Increase Aricept from 5 to 10mg daily   Start Zoloft 50mg daily for 1 week, then 100mg daily     Discussed with patient and caregiver informed consent, risks vs. benefits, alternative treatments, side effect profile and the inherent unpredictability of individual responses to these treatments. Answered any questions patient may have had. The patient expresses  understanding of the above and displays the capacity to agree with this current plan     Labs:  · TSH, T3, Free T4  · Vitamin B-12    RETURN TO CLINIC: In 1 month    Scar Barahona MD  [facility] PGY-2  6/4/2020 12:19 PM

## 2020-06-04 NOTE — PATIENT INSTRUCTIONS
Start Zoloft 50mg daily for 1 week, then 100mg daily thereafter  Increase dose of Aricept from 50mg to 100mg daily  Continue Seroquel 25mg twice a day and Trazodone 50mg at night as need for insomnia    [facility] (TSH, free T4, T3, Vitamin B-12)    Return to clinic in 1 month

## 2020-06-09 ENCOUNTER — LAB VISIT (OUTPATIENT)
Dept: LAB | Facility: HOSPITAL | Age: 85
End: 2020-06-09
Attending: STUDENT IN AN ORGANIZED HEALTH CARE EDUCATION/TRAINING PROGRAM
Payer: MEDICARE

## 2020-06-09 DIAGNOSIS — F03.91 MAJOR NEUROCOGNITIVE DISORDER DUE TO ALZHEIMER'S DISEASE, PROBABLE, WITH BEHAVIORAL DISTURBANCE: ICD-10-CM

## 2020-06-09 LAB
T3 SERPL-MCNC: 68 NG/DL (ref 60–180)
T4 FREE SERPL-MCNC: 1.07 NG/DL (ref 0.71–1.51)
TSH SERPL DL<=0.005 MIU/L-ACNC: 0.16 UIU/ML (ref 0.4–4)
VIT B12 SERPL-MCNC: 267 PG/ML (ref 210–950)

## 2020-06-09 PROCEDURE — 82607 VITAMIN B-12: CPT

## 2020-06-09 PROCEDURE — 84480 ASSAY TRIIODOTHYRONINE (T3): CPT

## 2020-06-09 PROCEDURE — 84439 ASSAY OF FREE THYROXINE: CPT

## 2020-06-09 PROCEDURE — 84443 ASSAY THYROID STIM HORMONE: CPT

## 2020-06-09 PROCEDURE — 36415 COLL VENOUS BLD VENIPUNCTURE: CPT | Mod: PO

## 2020-06-26 ENCOUNTER — TELEPHONE (OUTPATIENT)
Dept: PSYCHIATRY | Facility: CLINIC | Age: 85
End: 2020-06-26

## 2020-06-26 PROBLEM — M19.041 PRIMARY OSTEOARTHRITIS OF BOTH HANDS: Status: ACTIVE | Noted: 2020-06-26

## 2020-06-26 PROBLEM — M19.042 PRIMARY OSTEOARTHRITIS OF BOTH HANDS: Status: ACTIVE | Noted: 2020-06-26

## 2020-06-26 NOTE — TELEPHONE ENCOUNTER
Telephone Encounter    Patient's daughter had called saying medication (Zoloft) was misplaced and wanted a call back. Spoke with patient's daughter who says she found the medication, and while the medication was misplaced and pt missed several doses, she has been back on the Zoloft at the prescribed dose for over a week. Pt still anorexic, however is eating a little more since starting Zoloft. Also, mood is somewhat improved. Pt's daughter says they will be attending pt's virtual visit 7/1/20.    Scar Barahona MD  [facility] PGY-2  06/26/2020 11:13 AM

## 2020-07-01 ENCOUNTER — CLINICAL SUPPORT (OUTPATIENT)
Dept: PSYCHIATRY | Facility: CLINIC | Age: 85
End: 2020-07-01
Payer: MEDICARE

## 2020-07-01 DIAGNOSIS — F03.91 MAJOR NEUROCOGNITIVE DISORDER DUE TO ALZHEIMER'S DISEASE, PROBABLE, WITH BEHAVIORAL DISTURBANCE: Primary | ICD-10-CM

## 2020-07-01 PROCEDURE — 99442 PR PHYSICIAN TELEPHONE EVALUATION 11-20 MIN: ICD-10-PCS | Mod: 95,,, | Performed by: PSYCHIATRY & NEUROLOGY

## 2020-07-01 PROCEDURE — 99442 PR PHYSICIAN TELEPHONE EVALUATION 11-20 MIN: CPT | Mod: 95,,, | Performed by: PSYCHIATRY & NEUROLOGY

## 2020-07-01 RX ORDER — MAGNESIUM 200 MG
1000 TABLET ORAL DAILY
Qty: 30 TABLET | Refills: 2 | Status: SHIPPED | OUTPATIENT
Start: 2020-07-01

## 2020-07-01 NOTE — PROGRESS NOTES
"OUTPATIENT PSYCHIATRY Follow up visit     ENCOUNTER DATE:  7/1/2020  SITE:  [facility], [facility]  REFFERAL SOURCE:  Godwin Lo MD  LENGTH OF SESSION:  20 minutes      HISTORY OF PRESENTING ILLNESS:  Alejandrina Bee is a 85 y.o. female with previous history of Dementia who presents for initial assessment.    TELE PSYCHIATRY   Disclaimer   *The patient was informed despite using HIPPA compliant technology there may be risks including security breach, technological failure, inability to perform a comprehensive physical exam which could delay or prevent an accurate diagnosis, and potential complications from treatment decisions rendered over a telemedical platform. The patient understands and consented to the use of tele-health service as being a safe measure to mitigate during COVID Crisis.  The patient was also informed of the relationship between the physician and patient and the respective role of any other health care provider with respect to management of the patient; and notified that the pt may decline to receive medical services by telemedicine and may withdraw from such care at any time.     Patient's Current location, exact physical address: [address] [place] [address]  In Case of Emergency pts next of kin  Name: Jade Ricci  Phone number:  [phone]  Visit type: Virtual visit with audio  Total time spent with patient: 20 minutes        History as told by Patient - & or family/friend/spouse/caregiver with pts permission    History provided by the patient's daughter. States that the patient can at times be very anxious and fearful but overall she does feel the patient is better. States that she overall appears more restful. Her appetite has been "okay". At times the patient will eat three meals per day, but sometimes she will not eat breakfast. Patient remains compliant with medications but has been having isues with reflux, family will f/u with PCP regarding " "that.    Weight has remained stable    Pt with often become preservative on one word and at times has issues with naming.     Sometimes will refuse to take medications or take a shower, they have implemented behavioral modification which helps with that. Has not been aggressive with family since changes in the medication regimen    Daughter needs assistance in patient's care.     Does not want to place her into a nursing home      Attempt to interview patient, patient unable to engage due to cognitive issues.  -------------------  Initial history:  Per daughter, pt's son  suddenly of MI in March. Pt was living with him for 8-10 years in [place]. Daughter went to [place] to stay with mother for a few weeks, then brought mother back to [place]. Daughter was not raised by pt because pt had daughter when she was very young. However, daughter has kept in touch with mother and brother over the years. Says she would write her mother, but only saw her once a year. Daughter spoke with son several times a week prior to his death. Son took over pt's finances and took pt's car keys 8 years ago. Pt's cognition has been progressively declining since then. Memory and cognition has been getting much worse over the past 5 years. Has not been able to recognize daughter or granddaughter for at least the past 2 years. Pt paces in house, wanders outside, and is often delusional and aggressive. Delusions about being attacked and raped by multiple men and meeting up with  relatives (mother and son). Pt lives in home with daughter, granddaughter and 4 great-grandchildren (7, 16, 17, 18). Pt occasionally aggressive to great-granddaughter (7), slapping at her, most recently as last night, but never anything serious. Granddaughter says "you have have to watch her ." At night she will walk out into yard or down street. Has taken their keys, sat in their car and said "I want to go home." Pt gets anxious when daughter or anyone " "leaves. Seen RIS (talking to self) and repeats words frequently. Pt often asks where son, Bong is, even though he  in March. Pt gets depressed per daughter and will only want to sleep for days on end. Does have good appetite usually. Can dress herself, but needs help getting clothing from daughter. Daughter says pt wears disposable adult underwear, is not incontinent, but did have an accident last week. Grand-daughter says daughter is minimizing and pt is incontinent at least once a week and needs help using the restroom, otherwise she makes a mess. Family would like something to help with pt's aggression.      Pt has been on Trazodone, Seroquel, and Aricept since she was in [place], but unsure how often she was getting meds as they believe caregiver was incompetant. Daughter says only thing that helps with aggression is Trazodone, which helps minimally. Pt has no h/o CVA or other vascular disease and "overall my mother is in good physical health." They've tried getting pt home-health or other help, but haven't been able to do so because of the pandemic. Daughter is adamantly opposed to NH placement at all costs.      PSYCHIATRIC REVIEW OF SYMPTOMS: (Is patient experiencing or having changes in any of the following?)    Unable to fully assess d/t impaired cognition, but daughter provides details when able:    Symptoms of Depression:   Denies    Symptoms of Anxiety:   Improved but not resolved     Symptoms of Panic Attacks:  Denies    Symptoms of andre or hypomania:   Daughter denies    Symptoms of psychosis:   Details in initial HPI, seen RIS (talking to self), delusions about rape,  family members    Symptoms of PTSD:   Daughter denies    Sleep:   Improved with trazodone     Other Psych ROS:    Symptoms of OCD:   Daughter denies    Symptoms of Eating Disorders:   Denies    Symptoms of ADHD:   Daughter denies    Risk Parameters:  Daughter says pt has never tried to hurt herself or made threats of harm to " herself or others, but can occasionally get aggressive with great-granddaughter. Emphasized keeping them  from one another    PSYCHIATRIC MED REVIEW    Current psych meds: Aricept 10mg daily, Seroquel 25mg BID, Zoloft 100mg qD, Trazodone 50mg qhs PRN insomnia  Medication side effects:  Daughter denies  Medication compliance:  Adherent     Previous psych meds trials  None per daughter    Information provided by daughter to best of her ability:    PAST PSYCHIATRIC HISTORY:  Previous Psychiatric Diagnoses:  Dementia  Previous Psychiatric Hospitalizations:  no   Previous SI/HI:  no  Previous Suicide Attempts:  n/a  Previous Self injurious behaviors: no  Previous Medication Trials:  Aricept, seroquel, Trazodone  Psychiatric Care (current & past):  unclear  History of Psychotherapy:  unclear  History of Violence:  no      FAMILY HISTORY:  Psychiatric:  Sister- schizophrenia  Family H/o suicide:  no    SOCIAL HISTORY:  Developmental/Childhood:unknown  Education:unknown  Employment Status/Finances:Retired, owned an antique store  Relationship Status/Sexual Orientation:   and   10 years ago  Children: 2  Housing Status: lives with daughter, grand-daughter, 4 great-grandchildren    history:  NO  Access to Firearms: NO;  Locked up? N/A  Samaritan:was Muslim, not very Hoahaoism now  Recreational activities:loves to walk, walks one block    LEGAL HISTORY:   Past charges/incarcerations: No   Pending charges:No     NEUROLOGIC HISTORY:  Seizures:  no   Head trauma:  no    MEDICAL REVIEW OF SYSTEMS:  Unable to assess    MEDICAL HISTORY:  Past Medical History:   Diagnosis Date    Alzheimer disease     Cervical cancer     Hypertension     Hypothyroidism     IBS (irritable bowel syndrome)     IBS (irritable bowel syndrome)     Lymphoma        ALL MEDICATIONS:    Current Outpatient Medications:     carvediloL (COREG) 12.5 MG tablet, Take 1 tablet (12.5 mg total) by mouth 2 (two) times daily  with meals., Disp: 60 tablet, Rfl: 9    donepeziL (ARICEPT) 5 MG tablet, Take 2 tablets (10 mg total) by mouth once daily., Disp: 30 tablet, Rfl: 0    levothyroxine (SYNTHROID) 75 MCG tablet, Take 1 tablet (75 mcg total) by mouth before breakfast., Disp: 30 tablet, Rfl: 9    linaCLOtide (LINZESS) 145 mcg Cap capsule, Take 1 capsule (145 mcg total) by mouth once daily., Disp: 30 capsule, Rfl: 9    mupirocin (BACTROBAN) 2 % ointment, Apply topically 3 (three) times daily., Disp: 22 g, Rfl: 0    nitrofurantoin, macrocrystal-monohydrate, (MACROBID) 100 MG capsule, Take 100 mg by mouth 2 (two) times daily., Disp: , Rfl:     QUEtiapine (SEROQUEL) 25 MG Tab, Take 1 tablet (25 mg total) by mouth 2 (two) times daily., Disp: 60 tablet, Rfl: 0    sertraline (ZOLOFT) 50 MG tablet, Take 1 tablet daily for 7 days, then take 2 tablets daily after initial 7 days, Disp: 60 tablet, Rfl: 0    traZODone (DESYREL) 50 MG tablet, Take 1 tablet (50 mg total) by mouth nightly as needed for Insomnia., Disp: 30 tablet, Rfl: 0    triamterene-hydrochlorothiazide 37.5-25 mg (DYAZIDE) 37.5-25 mg per capsule, Take 1 capsule by mouth every morning., Disp: 30 capsule, Rfl: 9    ALLERGIES:  Review of patient's allergies indicates:  No Known Allergies    RELEVANT LABS/STUDIES:    Lab Results   Component Value Date    WBC 9.79 04/20/2020    HGB 15.6 04/20/2020    HCT 49.5 (H) 04/20/2020    MCV 97 04/20/2020     04/20/2020     BMP  Lab Results   Component Value Date     (L) 04/20/2020    K 4.1 04/20/2020    CL 99 04/20/2020    CO2 26 04/20/2020    BUN 9 04/20/2020    CREATININE 0.9 04/20/2020    CALCIUM 10.1 04/20/2020    ANIONGAP 10 04/20/2020    ESTGFRAFRICA >60.0 04/20/2020    EGFRNONAA 59.3 (A) 04/20/2020     Lab Results   Component Value Date    ALT 18 04/20/2020    AST 22 04/20/2020    ALKPHOS 106 04/20/2020    BILITOT 0.3 04/20/2020     Lab Results   Component Value Date    TSH 0.163 (L) 06/09/2020     No results found for:  "LABA1C, HGBA1C      VITALS  There were no vitals filed for this visit.    PHYSICAL EXAM  General: unable to assess via telephone   Neurologic:   Gait: unable to assess via telephone   Psychomotor signs:  Unable to assess via telephone   AIMS: unable to assess via telephone     PSYCHIATRIC EXAM:    Mental Status Exam:  Appearance: unable to assess via telephone   Behavior/Cooperation: limited d/t cognition, but agreed to speak via telephone   Speech:  normal volume, increased latency of response  Language: +issues with naming   Mood: "good"  Affect:  unable to assess via telephone   Thought Process: perseverative  Thought Content: unable to assess  Level of Consciousness: Disoriented to month, year, day of week, and birthdate   Memory:  Short and long term impaired   Attention/concentration: easily distracted  Fund of Knowledge:  Unable to assess   Insight:  Poor   Judgment: Limited      IMPRESSION:    Alejandrina Bee is a 85 y.o. female with history of Major Neurocognitive Disorder who presents for 1 month follow up.    DIAGNOSES:  Major Neurocognitive Disorder     PLAN:  Psych Med:   Continue Seroquel 25mg BID    Trazodone 50mg qhs PRN for insmonia   Continue Aricept 10mg daily    Continue Zoloft 100mg daily   Start B12 1000mcg qD     -Family will continue to work with SW to acquire additional help, reminded family of upcoming neurology appointment on 8/11     Discussed with patient and caregiver informed consent, risks vs. benefits, alternative treatments, side effect profile and the inherent unpredictability of individual responses to these treatments. Answered any questions patient may have had. The patient expresses understanding of the above and displays the capacity to agree with this current plan     Labs:  B12 267    RETURN TO CLINIC: In 1 month    Ashley Ramos MD  PGY 4 [facility]  7/1/2020 11:09 AM     "

## 2020-07-10 ENCOUNTER — TELEPHONE (OUTPATIENT)
Dept: PSYCHIATRY | Facility: CLINIC | Age: 85
End: 2020-07-10

## 2020-07-11 NOTE — PROGRESS NOTES
Staff Note:      Pt was interviewed personally by me via Epic [facility] with Pt's daughter and Resident also present.   I agree with the Resident's findings.     Pt remains slightly suspicious, with observable memory and cognitive deficits.  Her daughter reports that appetite, sleep, delusions, and behavior all seem better, with no identified adverse effects.  She is aware of risks of antipsychotic therapy but agrees it is is necessary to achieve goal of keeping Pt at home.  B12 was ordered sublingually.    GABRIELLE

## 2020-07-16 ENCOUNTER — TELEPHONE (OUTPATIENT)
Dept: SPEECH THERAPY | Facility: HOSPITAL | Age: 85
End: 2020-07-16

## 2020-07-16 DIAGNOSIS — T17.908A ASPIRATION INTO AIRWAY, INITIAL ENCOUNTER: ICD-10-CM

## 2020-07-16 DIAGNOSIS — R13.13 PHARYNGEAL DYSPHAGIA: Primary | ICD-10-CM

## 2020-07-17 ENCOUNTER — TELEPHONE (OUTPATIENT)
Dept: SPEECH THERAPY | Facility: HOSPITAL | Age: 85
End: 2020-07-17

## 2020-07-17 DIAGNOSIS — R13.13 PHARYNGEAL DYSPHAGIA: Primary | ICD-10-CM

## 2020-07-17 DIAGNOSIS — T17.908A ASPIRATION INTO AIRWAY, INITIAL ENCOUNTER: ICD-10-CM

## 2020-07-20 PROBLEM — Z00.00 ANNUAL PHYSICAL EXAM: Status: RESOLVED | Noted: 2020-04-20 | Resolved: 2020-07-20

## 2020-07-27 ENCOUNTER — CLINICAL SUPPORT (OUTPATIENT)
Dept: SPEECH THERAPY | Facility: HOSPITAL | Age: 85
End: 2020-07-27
Payer: MEDICARE

## 2020-07-27 ENCOUNTER — HOSPITAL ENCOUNTER (OUTPATIENT)
Dept: RADIOLOGY | Facility: HOSPITAL | Age: 85
Discharge: HOME | End: 2020-07-27
Attending: INTERNAL MEDICINE
Payer: MEDICARE

## 2020-07-27 DIAGNOSIS — T17.908A ASPIRATION INTO AIRWAY, INITIAL ENCOUNTER: ICD-10-CM

## 2020-07-27 DIAGNOSIS — R13.13 PHARYNGEAL DYSPHAGIA: ICD-10-CM

## 2020-07-27 DIAGNOSIS — R13.13 PHARYNGEAL DYSPHAGIA: Primary | ICD-10-CM

## 2020-07-27 PROCEDURE — 74230 X-RAY XM SWLNG FUNCJ C+: CPT | Mod: TC

## 2020-07-27 PROCEDURE — 92611 MOTION FLUOROSCOPY/SWALLOW: CPT | Performed by: SPEECH-LANGUAGE PATHOLOGIST

## 2020-07-27 PROCEDURE — 74230 FL MODIFIED BARIUM SWALLOW SPEECH STUDY: ICD-10-PCS | Mod: 26,,, | Performed by: RADIOLOGY

## 2020-07-27 PROCEDURE — A9698 NON-RAD CONTRAST MATERIALNOC: HCPCS

## 2020-07-27 PROCEDURE — 74230 X-RAY XM SWLNG FUNCJ C+: CPT | Mod: 26,,, | Performed by: RADIOLOGY

## 2020-07-27 PROCEDURE — 25500020 PHARM REV CODE 255

## 2020-07-27 RX ADMIN — BARIUM SULFATE 80 ML: 0.81 POWDER, FOR SUSPENSION ORAL at 02:07

## 2020-07-28 NOTE — PROGRESS NOTES
MODIFIED BARIUM SWALLOW STUDY    REASON FOR REFERRAL:  Alejandrina Bee, age 85, was referred by Dr. Karolyn Younger of [facility] for a Modified Barium Swallow Study to rule out aspiration, assess her overall swallowing function, and determine safest consistencies for oral intake.  She was accompanied by her daughter who is her primary caregiver.    Mrs. Bee has a history of Alzheimer's and, per the accompanying notes and history provided by her daughter, she has been pointing to the top of her chest (indicated superior sternum area) when eating.  She is not choking with eating/drinking, but only takes small bites and her daughter is concerned that she is losing weight.        MEDICAL HISTORY:  Past Medical History:   Diagnosis Date    Alzheimer disease     Cervical cancer     Hypertension     Hypothyroidism     IBS (irritable bowel syndrome)     IBS (irritable bowel syndrome)     Lymphoma         SURGICAL HISTORY:  Past Surgical History:   Procedure Laterality Date    HIP REPLACEMENT ARTHROPLASTY Bilateral     TOTAL KNEE ARTHROPLASTY Bilateral        SWALLOWING HISTORY:  As above.  Only has teeth on upper arch, so mastication is difficult.  Solids are cooked tender and cut very small for her.  She drinks fluids and like smoothies.  Takes a handful of small pills with liquid.    FAMILY HISTORY:  Family History   Problem Relation Age of Onset    Cancer Mother     Anxiety disorder Daughter     Cancer Daughter     Heart attack Son         SOCIAL HISTORY:  Mrs. Bee lives with her daughter in [place] (since March 2020)..  She formerly lived in [place] with her son until his death.         Tobacco:  Never smoker, per EMR.  ETOH:  Never, per EMR.    BEHAVIOR:  Mrs. Bee was a pleasant woman who was seen with her daughter throughout the study. (Her daughter verbalized feeling claustrophobic in the space where her mother could readily see her, and stood nearby just out of her field  "of vision, but continually talked to her and moved into her field of vision occasionally.  Mrs. Bee seemed more anxious when she could not see her daughter per her looking around and asking for her.)  She had normal affect and social interaction (in terms of preserved exchanges and polite requests).  Perseverated in repeating, "I'm OK," and, "I'm going home."  Appeared somewhat anxious.  Arrived via wheelchair due to distance, but ambulated independently to transfer.  Occasionally attempted to stand up to leave mid-study, but could be redirected.  She was able to fully cooperate during the study.  Results of today's assessment were considered indicative of her current levels of swallowing functioning.      HEARING:  Subjectively, within normal limits.     ORAL PERIPHERAL:   Informal examination of the oral mechanism revealed structures and functioning within functional limits for swallowing and speech purposes.  Edentulous mandibular arch.    Voice quality was within normal limits with no wet quality before, during, or after the study.      TEST FINDINGS:   Mrs. Bee was seen in Radiology with the Radiologist for a Modified Barium Swallow Study.  She was seated on a stool for a left lateral videofluoroscopic view.      Consistencies assessed using radiopaque barium contrast:  thin (single and continuous swallows via open cup),   thin puree (half and full teaspoons) via spoon,  thick puree (1-teaspoon bolus) via spoon, and  solid (1/8  cracker boluses) by Mrs. Bee's hand.  Attempted 1/2  barium tablet embedded in puree and water, but she consistently spit it out.    Strategies:  Small bites (1/2 teaspoon) -- better clearance, less residue  Liquid washes -- facilitated clearance of vallecular residue.    Phases:  Oral:  Mrs. Bee was able to obtain liquid and strip utensils well with no loss of material from the oral cavity.  She moved boluses through the oral cavity with appropriate to mildly slowed transit " time.  There was no pooling of liquids in the mouth.  Swallow reflex was triggered within normal limits for liquids, but delayed with solids which were spilled to the valleculae for 1-2 seconds prior to initiation of swallow..    Pharyngeal:  Boluses moved through the pharyngeal phase with no laryngeal penetration and no aspiration and no nasal regurgitation.     - Timely initiation with liquids; delayed with solids (see above)  - Adequate soft palate elevation  - Adequate laryngeal elevation and anterior hyoid excursion  - Reduced tongue base retraction  - Incomplete epiglottic inversion; tended to abut PPW.  Appeared to be a function of proximity and curvature of spine at the level opposing the epiglottis.  - Complete laryngeal vestibular closure  - Adequate pharyngeal stripping wave  - Adequate PE segment opening.  -  Significant pharyngeal residue in valleculae with masticated solid and thick puree with lesser volumes for thick puree in BOT , hypopharynx, and posterior oral cavity.      Cervical Esophageal:  Boluses entered the upper esophagus within normal limits.  No obstruction was noted.    Rosenbeck 8-point Penetration-Aspiration Scale:  1 - Material does not enter airway.      IMPRESSIONS:   This 85 y.o. woman appears to present with  1.  Moderate oropharyngeal dysphagia characterized by reduced ability to masticate solids due to missing teeth, premature spillage of solid boluses to valleculae, reduced epiglottic inversion, and reduced pharyngeal contraction resulting in pharyngeal stasis, particularly in the valleculae and with denser bolus viscosities.  This was best reduced by use of smaller bites and liquid washes.  No laryngeal penetration or aspiration.  2.  Alzheimer's disease.      RECOMMENDATIONS/PLAN OF CARE:   It is felt that Mrs. Bee would benefit from  1.  Continuation of her current pureed to mechanical soft consistency diet with thin liquids using the following strategies and common  "aspiration precautions, including, but not limited to   A.  Appropriate upright seating for all eating and drinking.   B.  Small bites (1/2 teaspoon).   C.  Alternating liquids with solids (2-3 sips per food bolus).   D.  Mincing any food that would normally be masticated.   E.  Preparing foods to tender, moist state.   F.  Monitoring for any signs/symptoms of aspiration (such as wet/gurgly voice that does not clear with coughing, inability to make any voice sounds, any persistent coughing with oral intake, otherwise unexplained fever, unexplained increased or new difficulty or discomfort breathing, unexplained increase in sleepiness/lethargy/significant fatigue, unexplained increase or new onset confusion or change in cognitive functioning, or any other unexplained change in health or well-being that could be related to swallowing).  2.  Continuing use of supplemental liquids (e.g., smoothies) to augment nutrients and calories as needed.  Consider consultation with nutritionist to assist with target intake.  3.  Monitor weight.  4.  It is not felt that Mrs. Bee would be an appropriate candidate for dysphagia therapy due to her current and progressive cognitive issues, but coaching to use an effortful or "hard" swallow may both facilitate improved clearance of bolus material and provide a level of exercise to her swallowing function.  5.  Follow up with Dr. Younger as directed.  6.  Repeat MBSS as needed.      "

## 2020-07-28 NOTE — PATIENT INSTRUCTIONS
"  RECOMMENDATIONS/PLAN OF CARE:   It is felt that Mrs. Bee would benefit from  1.  Continuation of her current pureed to mechanical soft consistency diet with thin liquids using the following strategies and common aspiration precautions, including, but not limited to   A.  Appropriate upright seating for all eating and drinking.   B.  Small bites (1/2 teaspoon).   C.  Alternating liquids with solids (2-3 sips per food bolus).   D.  Mincing any food that would normally be masticated.   E.  Preparing foods to tender, moist state.   F.  Monitoring for any signs/symptoms of aspiration (such as wet/gurgly voice that does not clear with coughing, inability to make any voice sounds, any persistent coughing with oral intake, otherwise unexplained fever, unexplained increased or new difficulty or discomfort breathing, unexplained increase in sleepiness/lethargy/significant fatigue, unexplained increase or new onset confusion or change in cognitive functioning, or any other unexplained change in health or well-being that could be related to swallowing).  2.  Continuing use of supplemental liquids (e.g., smoothies) to augment nutrients and calories as needed.  Consider consultation with nutritionist to assist with target intake.  3.  Monitor weight.  4.  It is not felt that Mrs. Bee would be an appropriate candidate for dysphagia therapy due to her current and progressive cognitive issues, but coaching to use an effortful or "hard" swallow may both facilitate improved clearance of bolus material and provide a level of exercise to her swallowing function.  5.  Follow up with Dr. Younger as directed.  6.  Repeat MBSS as needed.      "

## 2020-07-28 NOTE — PLAN OF CARE
IMPRESSIONS:   This 85 y.o. woman appears to present with  1.  Moderate oropharyngeal dysphagia characterized by reduced ability to masticate solids due to missing teeth, premature spillage of solid boluses to valleculae, reduced epiglottic inversion, and reduced pharyngeal contraction resulting in pharyngeal stasis, particularly in the valleculae and with denser bolus viscosities.  This was best reduced by use of smaller bites and liquid washes.  No laryngeal penetration or aspiration.  2.  Alzheimer's disease.      RECOMMENDATIONS/PLAN OF CARE:   It is felt that Mrs. Bee would benefit from  1.  Continuation of her current pureed to mechanical soft consistency diet with thin liquids using the following strategies and common aspiration precautions, including, but not limited to   A.  Appropriate upright seating for all eating and drinking.   B.  Small bites (1/2 teaspoon).   C.  Alternating liquids with solids (2-3 sips per food bolus).   D.  Mincing any food that would normally be masticated.   E.  Preparing foods to tender, moist state.   F.  Monitoring for any signs/symptoms of aspiration (such as wet/gurgly voice that does not clear with coughing, inability to make any voice sounds, any persistent coughing with oral intake, otherwise unexplained fever, unexplained increased or new difficulty or discomfort breathing, unexplained increase in sleepiness/lethargy/significant fatigue, unexplained increase or new onset confusion or change in cognitive functioning, or any other unexplained change in health or well-being that could be related to swallowing).  2.  Continuing use of supplemental liquids (e.g., smoothies) to augment nutrients and calories as needed.  Consider consultation with nutritionist to assist with target intake.  3.  Monitor weight.  4.  It is not felt that Mrs. Bee would be an appropriate candidate for dysphagia therapy due to her current and progressive cognitive issues, but coaching to use  "an effortful or "hard" swallow may both facilitate improved clearance of bolus material and provide a level of exercise to her swallowing function.  5.  Follow up with Dr. Younger as directed.  6.  Repeat MBSS as needed.      "

## 2020-07-31 NOTE — TELEPHONE ENCOUNTER
Last filled 7/18/20.  lov 5/29/20 to see you.  She saw dr marshall one time in April. Send to him as pcp?  This was also faxed today 7/31

## 2020-08-04 ENCOUNTER — OFFICE VISIT (OUTPATIENT)
Dept: PSYCHIATRY | Facility: CLINIC | Age: 85
End: 2020-08-04
Payer: MEDICARE

## 2020-08-04 VITALS
BODY MASS INDEX: 27.15 KG/M2 | DIASTOLIC BLOOD PRESSURE: 59 MMHG | WEIGHT: 162.94 LBS | SYSTOLIC BLOOD PRESSURE: 123 MMHG | HEART RATE: 67 BPM | HEIGHT: 65 IN

## 2020-08-04 DIAGNOSIS — F03.90 MAJOR NEUROCOGNITIVE DISORDER: Primary | ICD-10-CM

## 2020-08-04 PROCEDURE — 1159F MED LIST DOCD IN RCRD: CPT | Mod: S$GLB,,, | Performed by: PSYCHIATRY & NEUROLOGY

## 2020-08-04 PROCEDURE — 3078F PR MOST RECENT DIASTOLIC BLOOD PRESSURE < 80 MM HG: ICD-10-PCS | Mod: CPTII,S$GLB,, | Performed by: PSYCHIATRY & NEUROLOGY

## 2020-08-04 PROCEDURE — 99999 PR PBB SHADOW E&M-EST. PATIENT-LVL III: ICD-10-PCS | Mod: PBBFAC,,, | Performed by: PSYCHIATRY & NEUROLOGY

## 2020-08-04 PROCEDURE — 1101F PR PT FALLS ASSESS DOC 0-1 FALLS W/OUT INJ PAST YR: ICD-10-PCS | Mod: CPTII,S$GLB,, | Performed by: PSYCHIATRY & NEUROLOGY

## 2020-08-04 PROCEDURE — 3074F SYST BP LT 130 MM HG: CPT | Mod: CPTII,S$GLB,, | Performed by: PSYCHIATRY & NEUROLOGY

## 2020-08-04 PROCEDURE — 1101F PT FALLS ASSESS-DOCD LE1/YR: CPT | Mod: CPTII,S$GLB,, | Performed by: PSYCHIATRY & NEUROLOGY

## 2020-08-04 PROCEDURE — 99214 OFFICE O/P EST MOD 30 MIN: CPT | Mod: S$GLB,,, | Performed by: PSYCHIATRY & NEUROLOGY

## 2020-08-04 PROCEDURE — 3074F PR MOST RECENT SYSTOLIC BLOOD PRESSURE < 130 MM HG: ICD-10-PCS | Mod: CPTII,S$GLB,, | Performed by: PSYCHIATRY & NEUROLOGY

## 2020-08-04 PROCEDURE — 3078F DIAST BP <80 MM HG: CPT | Mod: CPTII,S$GLB,, | Performed by: PSYCHIATRY & NEUROLOGY

## 2020-08-04 PROCEDURE — 99214 PR OFFICE/OUTPT VISIT, EST, LEVL IV, 30-39 MIN: ICD-10-PCS | Mod: S$GLB,,, | Performed by: PSYCHIATRY & NEUROLOGY

## 2020-08-04 PROCEDURE — 1159F PR MEDICATION LIST DOCUMENTED IN MEDICAL RECORD: ICD-10-PCS | Mod: S$GLB,,, | Performed by: PSYCHIATRY & NEUROLOGY

## 2020-08-04 PROCEDURE — 99999 PR PBB SHADOW E&M-EST. PATIENT-LVL III: CPT | Mod: PBBFAC,,, | Performed by: PSYCHIATRY & NEUROLOGY

## 2020-08-04 RX ORDER — QUETIAPINE FUMARATE 50 MG/1
50 TABLET, FILM COATED ORAL NIGHTLY
Qty: 30 TABLET | Refills: 2 | OUTPATIENT
Start: 2020-08-04 | End: 2020-08-04

## 2020-08-04 RX ORDER — QUETIAPINE FUMARATE 50 MG/1
50 TABLET, FILM COATED ORAL NIGHTLY
Qty: 30 TABLET | Refills: 2 | Status: SHIPPED | OUTPATIENT
Start: 2020-08-04 | End: 2020-08-04

## 2020-08-04 RX ORDER — QUETIAPINE FUMARATE 25 MG/1
25 TABLET, FILM COATED ORAL DAILY
Qty: 30 TABLET | Refills: 2 | Status: SHIPPED | OUTPATIENT
Start: 2020-08-04 | End: 2020-08-04

## 2020-08-04 RX ORDER — QUETIAPINE FUMARATE 50 MG/1
50 TABLET, FILM COATED ORAL NIGHTLY
Qty: 30 TABLET | Refills: 2 | Status: SHIPPED | OUTPATIENT
Start: 2020-08-04 | End: 2020-11-02

## 2020-08-04 RX ORDER — QUETIAPINE FUMARATE 25 MG/1
25 TABLET, FILM COATED ORAL DAILY
Qty: 30 TABLET | Refills: 2 | Status: SHIPPED | OUTPATIENT
Start: 2020-08-04 | End: 2020-11-02

## 2020-08-04 RX ORDER — QUETIAPINE FUMARATE 25 MG/1
25 TABLET, FILM COATED ORAL DAILY
Qty: 30 TABLET | Refills: 2 | OUTPATIENT
Start: 2020-08-04 | End: 2020-08-04

## 2020-08-04 NOTE — PATIENT INSTRUCTIONS
Please return to clinic in 2 months.     See medication changes below:  - Stop Trazadone 50 mg as needed nightly. It is okay to take the residual tablets as needed.   - Continue Seroquel 25 mg in the morning. The nightly dose of Seroquel will be increased to 50 mg.   - Paper prescriptions have been provided today.

## 2020-08-04 NOTE — PROGRESS NOTES
"  OUTPATIENT PSYCHIATRY FOLLOW UP VISIT    ENCOUNTER DATE:  2020  SITE:  [facility], [facility]  LENGTH OF SESSION:  30 minutes      CHIEF COMPLAINT:  Follow Up Visit, worsening insomnia and paranoia      HISTORY OF PRESENTING ILLNESS:  Alejandrina Bee is a 85 y.o. female with history of Neurocognitive Disorder, hypothyroidism who presents for follow up appointment.      Plan at last appointment on 2020: Titrate Seroquel to 100 mg daily, take Seroquel 25 mg BID, Take Trazadone 50 mg at night as needed.       Interval history as told by daughter as patient is unable to provide hx:  Patient has had improvement in daily aggression with Seroquel/Zoloft. However patient is agitated at night as evidenced by crying out for her son with extended sleep latency. This behavior has worsened over the past month, associated with increased paranoia and AVH. Patient's is able to sleep when administered Trazadone, however, daughter reports increased sedation the next morning. Patient is also noted by daughter to be hallucinating, often appearing frightened, saying "Aliyah cut off my toes". Of note, daughter endorses hx of anxiety and admits that she has a great deal of hesitance giving her mother scheduled trazadone.       PSYCHIATRIC REVIEW OF SYSTEMS:    Symptoms of Depression:  Worsened, patient crying herself to sleep in grief over  son    Symptoms of Anxiety/ panic attacks:Denies    Symptoms of Ritu/Hypomania: Denies    Symptoms of psychosis: Yes, patient endorses that "Aliyah cut her toes off" and if often found with eyes closed and hands covering face.       Sleep:  Decreased, patient needs PRN Trazadone frequently    Appetite:  Unchanged    Other: N/a    Alcohol: N/a    Illicit Drugs: N/a    Psychosocial stressors:  Recent death of her son 6 months ago, now moved to [place] with daughter who she has not spent significant time with during her life.     Risk Parameters:  Unable to directly access for " sucidal/homicidal ideation as patient cannot provide hx. However, per daughter patient has not exhibited this behavior.      PSYCHIATRIC MED REVIEW    Current psych meds  Medication side effects:  Increased somnolence from Trazadone  Medication compliance:  Yes    Previous psych meds trials  See current meds    PAST PSYCHIATRIC, MEDICAL, AND SOCIAL HISTORY REVIEWED  The patient's past medical, family and social history have been reviewed and updated as appropriate within the electronic medical record - see encounter notes.    MEDICAL REVIEW OF SYSTEMS:  Complete review of systems performed covering Constitutional, Musculoskeletal, Neurologic.  All systems negative.    ALL MEDICATIONS:    Current Outpatient Medications:     carvediloL (COREG) 12.5 MG tablet, Take 1 tablet (12.5 mg total) by mouth 2 (two) times daily with meals., Disp: 60 tablet, Rfl: 9    cyanocobalamin, vitamin B-12, 1,000 mcg Subl, Place 1,000 mcg under the tongue once daily., Disp: 30 tablet, Rfl: 2    donepeziL (ARICEPT) 5 MG tablet, Take 2 tablets (10 mg total) by mouth once daily., Disp: 30 tablet, Rfl: 0    levothyroxine (SYNTHROID) 75 MCG tablet, Take 1 tablet (75 mcg total) by mouth before breakfast., Disp: 30 tablet, Rfl: 9    linaCLOtide (LINZESS) 145 mcg Cap capsule, Take 1 capsule (145 mcg total) by mouth once daily., Disp: 30 capsule, Rfl: 9    mupirocin (BACTROBAN) 2 % ointment, APPLY EXTERNALLY TO THE AFFECTED AREA THREE TIMES DAILY, Disp: 22 g, Rfl: 0    nitrofurantoin, macrocrystal-monohydrate, (MACROBID) 100 MG capsule, Take 100 mg by mouth 2 (two) times daily., Disp: , Rfl:     QUEtiapine (SEROQUEL) 25 MG Tab, Take 1 tablet (25 mg total) by mouth once daily., Disp: 30 tablet, Rfl: 2    QUEtiapine (SEROQUEL) 50 MG tablet, Take 1 tablet (50 mg total) by mouth nightly., Disp: 30 tablet, Rfl: 2    sertraline (ZOLOFT) 100 MG tablet, Take 1 tablet (100 mg total) by mouth once daily. Note that these tablets are twice the  "strength of the previous prescription., Disp: 30 tablet, Rfl: 0    triamterene-hydrochlorothiazide 37.5-25 mg (DYAZIDE) 37.5-25 mg per capsule, Take 1 capsule by mouth every morning., Disp: 30 capsule, Rfl: 9    ALLERGIES:  Review of patient's allergies indicates:  No Known Allergies    RELEVANT LABS/STUDIES:    Lab Results   Component Value Date    WBC 9.79 04/20/2020    HGB 15.6 04/20/2020    HCT 49.5 (H) 04/20/2020    MCV 97 04/20/2020     04/20/2020     BMP  Lab Results   Component Value Date     (L) 04/20/2020    K 4.1 04/20/2020    CL 99 04/20/2020    CO2 26 04/20/2020    BUN 9 04/20/2020    CREATININE 0.9 04/20/2020    CALCIUM 10.1 04/20/2020    ANIONGAP 10 04/20/2020    ESTGFRAFRICA >60.0 04/20/2020    EGFRNONAA 59.3 (A) 04/20/2020     Lab Results   Component Value Date    ALT 18 04/20/2020    AST 22 04/20/2020    ALKPHOS 106 04/20/2020    BILITOT 0.3 04/20/2020     Lab Results   Component Value Date    TSH 0.163 (L) 06/09/2020     No results found for: LABA1C, HGBA1C    VITALS  Vitals:    08/04/20 0959   BP: (!) 123/59   Pulse: 67   Weight: 73.9 kg (162 lb 14.7 oz)   Height: 5' 5" (1.651 m)       PHYSICAL EXAM  General: well developed, well nourished  Neurologic:   Gait: Normal  Psychomotor signs:  Mild tremor in hands appreciated.   AIMS: none    PSYCHIATRIC EXAM:     Mental Status Exam:  Appearance: Patient appears frightened,,visbly paranoid, scanning the room  Behavior/Cooperation: restless and fidgety   Speech: impoverished, spontaneous and normal rate when patient is speaking  Language: uses words appropriately; NO aphasia or dysarthria  Mood: unable to access  Affect  Thought Process: loose associations  Thought Content: normal, no suicidality, no homicidality, delusions, or paranoia, hallucinations: (auditory: yes, visual: yes) Patient often referring to her daughter as mom  Level of Consciousness: Disoriented x 3. Able to say name when prompted by daughter  Memory:  Impaired   0/3 " immediate, 0/3 at 5 minutes    Recent:  Impaired   Remote:  Impaired  Attention/concentration: Impaired  Fund of Knowledge:  Unable to access  Insight:  Impaired  Judgment: Impaired      IMPRESSION:    Alejandrina Bee is a 85 y.o. female with history of Major Neurocognitive Disorder who presents for follow up appointment for continued paranoia, crying spells, insomnia.     Status/Progress:  Based on the examination today, the patient's problem(s) is/are failing to respond as expected to treatment.  New problems have been presented today.     DIAGNOSES:    ICD-10-CM ICD-9-CM   1. Major neurocognitive disorder  F01.50 294.20       PLAN:  Psych Med:  Continue [id number] mg daily / Start Seroquel 25 mg in the morning and 50 mg at night to target insomnia and psychosis.     Discontinue Trazadone 50 mg PRN.    Discussed with daughter informed consent, risks vs. benefits, alternative treatments, side effect profile and the inherent unpredictability of individual responses to these treatments. Answered any questions daughter may have had. The daughter expresses understanding of the above and displays the capacity to agree with this current plan     Other: TSH notably low, but T3/T4 WNL. No changes to Synthroid 75 mg daily.     RETURN TO CLINIC:  Follow up in 2 months. Patient provided AVS with prescription adjustments.     Emerson Friend MD  [facility] PGY-2  8/4/2020 10:05 AM

## 2020-08-05 RX ORDER — MUPIROCIN 20 MG/G
OINTMENT TOPICAL
Qty: 22 G | Refills: 0 | Status: SHIPPED | OUTPATIENT
Start: 2020-08-05

## 2020-08-11 ENCOUNTER — OFFICE VISIT (OUTPATIENT)
Dept: NEUROLOGY | Facility: CLINIC | Age: 85
End: 2020-08-11
Payer: MEDICARE

## 2020-08-11 VITALS
BODY MASS INDEX: 26.93 KG/M2 | HEART RATE: 69 BPM | DIASTOLIC BLOOD PRESSURE: 72 MMHG | WEIGHT: 161.63 LBS | SYSTOLIC BLOOD PRESSURE: 112 MMHG | HEIGHT: 65 IN

## 2020-08-11 DIAGNOSIS — F03.91 MAJOR NEUROCOGNITIVE DISORDER DUE TO ALZHEIMER'S DISEASE, PROBABLE, WITH BEHAVIORAL DISTURBANCE: ICD-10-CM

## 2020-08-11 DIAGNOSIS — G30.9 ALZHEIMER'S DEMENTIA WITHOUT BEHAVIORAL DISTURBANCE, UNSPECIFIED TIMING OF DEMENTIA ONSET: ICD-10-CM

## 2020-08-11 DIAGNOSIS — F02.80 ALZHEIMER'S DEMENTIA WITHOUT BEHAVIORAL DISTURBANCE, UNSPECIFIED TIMING OF DEMENTIA ONSET: ICD-10-CM

## 2020-08-11 PROCEDURE — 99204 OFFICE O/P NEW MOD 45 MIN: CPT | Mod: S$GLB,,, | Performed by: PSYCHIATRY & NEUROLOGY

## 2020-08-11 PROCEDURE — 99999 PR PBB SHADOW E&M-EST. PATIENT-LVL IV: CPT | Mod: PBBFAC,,, | Performed by: PSYCHIATRY & NEUROLOGY

## 2020-08-11 PROCEDURE — 3078F PR MOST RECENT DIASTOLIC BLOOD PRESSURE < 80 MM HG: ICD-10-PCS | Mod: CPTII,S$GLB,, | Performed by: PSYCHIATRY & NEUROLOGY

## 2020-08-11 PROCEDURE — 3078F DIAST BP <80 MM HG: CPT | Mod: CPTII,S$GLB,, | Performed by: PSYCHIATRY & NEUROLOGY

## 2020-08-11 PROCEDURE — 1101F PT FALLS ASSESS-DOCD LE1/YR: CPT | Mod: CPTII,S$GLB,, | Performed by: PSYCHIATRY & NEUROLOGY

## 2020-08-11 PROCEDURE — 1159F PR MEDICATION LIST DOCUMENTED IN MEDICAL RECORD: ICD-10-PCS | Mod: S$GLB,,, | Performed by: PSYCHIATRY & NEUROLOGY

## 2020-08-11 PROCEDURE — 99204 PR OFFICE/OUTPT VISIT, NEW, LEVL IV, 45-59 MIN: ICD-10-PCS | Mod: S$GLB,,, | Performed by: PSYCHIATRY & NEUROLOGY

## 2020-08-11 PROCEDURE — 1159F MED LIST DOCD IN RCRD: CPT | Mod: S$GLB,,, | Performed by: PSYCHIATRY & NEUROLOGY

## 2020-08-11 PROCEDURE — 1126F PR PAIN SEVERITY QUANTIFIED, NO PAIN PRESENT: ICD-10-PCS | Mod: S$GLB,,, | Performed by: PSYCHIATRY & NEUROLOGY

## 2020-08-11 PROCEDURE — 3074F SYST BP LT 130 MM HG: CPT | Mod: CPTII,S$GLB,, | Performed by: PSYCHIATRY & NEUROLOGY

## 2020-08-11 PROCEDURE — 1101F PR PT FALLS ASSESS DOC 0-1 FALLS W/OUT INJ PAST YR: ICD-10-PCS | Mod: CPTII,S$GLB,, | Performed by: PSYCHIATRY & NEUROLOGY

## 2020-08-11 PROCEDURE — 1126F AMNT PAIN NOTED NONE PRSNT: CPT | Mod: S$GLB,,, | Performed by: PSYCHIATRY & NEUROLOGY

## 2020-08-11 PROCEDURE — 99999 PR PBB SHADOW E&M-EST. PATIENT-LVL IV: ICD-10-PCS | Mod: PBBFAC,,, | Performed by: PSYCHIATRY & NEUROLOGY

## 2020-08-11 PROCEDURE — 3074F PR MOST RECENT SYSTOLIC BLOOD PRESSURE < 130 MM HG: ICD-10-PCS | Mod: CPTII,S$GLB,, | Performed by: PSYCHIATRY & NEUROLOGY

## 2020-08-11 RX ORDER — OMEPRAZOLE 20 MG/1
CAPSULE, DELAYED RELEASE ORAL
COMMUNITY
Start: 2020-07-02

## 2020-08-11 NOTE — PROGRESS NOTES
[facility] NEUROLOGY  [facility], [place]    Date: 8/11/20  Patient Name: Alejandrina Bee   MRN: [id number]   PCP: Godwin Lo  Referring Provider: Godwin Lo MD    Assessment:   Alejandrina Bee is a 85 y.o. female Presenting in follow-up for management of Alzheimer's dementia.  Patient with advanced stage disease.  Agree with use of Seroquel and Zoloft for behavioral control.  Suspect that patient is not driving significant benefit from Aricept at this time.  Will discuss discontinuation with her geriatric psychiatrist at the request of the patient's daughter.  Extensive counseling on advanced care planning and safety provided.    Plan:     Problem List Items Addressed This Visit        Neuro    Major neurocognitive disorder due to Alzheimer's disease, probable, with behavioral disturbance    Current Assessment & Plan     -- continue current seroquel, zoloft  -- favor discontinuation of aricept           Other Visit Diagnoses     Alzheimer's dementia without behavioral disturbance, unspecified timing of dementia onset              Balbir Aceves MD  [facility]   Department of Neurology    Patient note was created using MModal Dictation.  Any errors in syntax or even information may not have been identified and edited on initial review prior to signing this note.  Subjective:   Patient seen in consultation at the request of Godwin Lo MD for the evaluation of dementia. A copy of this note will be sent to the referring physician.        HPI:   Ms. Alejandrina Bee is a 85 y.o. female presenting in follow-up for management of dementia.  Patient presents today with her daughter who provides the history.  The patient engages in largely nonsensical speech and is highly distractible throughout the encounter.  The patient has recently relocated to the area after the death of her son who had previously acted as her caregiver.  Her daughter is now caring for her full-time at  home.  The patient requires assistance with all activities of daily living including feeding herself, dressing, and bathing.  She is incontinent.  Her daughter states she has longstanding dysphagia and requires all of her foods to be soft or pureed.  Her daughter states that she occasionally seems to perseverate on bad things happen to her however her daughter feels that this might be something she saw a movie.  She can sleep for long periods of time as long as from 8:00 p.m. to noon.  Her daughter reports that she is occasionally anxious and irritable this states she feels she is able to manage at home.  The patient's daughter states that she does not wish to have the patient placed in a nursing facility under any circumstances.    PAST MEDICAL HISTORY:  Past Medical History:   Diagnosis Date    Alzheimer disease     Cervical cancer     Hypertension     Hypothyroidism     IBS (irritable bowel syndrome)     IBS (irritable bowel syndrome)     Lymphoma        PAST SURGICAL HISTORY:  Past Surgical History:   Procedure Laterality Date    HIP REPLACEMENT ARTHROPLASTY Bilateral     TOTAL KNEE ARTHROPLASTY Bilateral        CURRENT MEDS:  Current Outpatient Medications   Medication Sig Dispense Refill    carvediloL (COREG) 12.5 MG tablet Take 1 tablet (12.5 mg total) by mouth 2 (two) times daily with meals. 60 tablet 9    cyanocobalamin, vitamin B-12, 1,000 mcg Subl Place 1,000 mcg under the tongue once daily. 30 tablet 2    donepeziL (ARICEPT) 5 MG tablet Take 2 tablets (10 mg total) by mouth once daily. 30 tablet 0    levothyroxine (SYNTHROID) 75 MCG tablet Take 1 tablet (75 mcg total) by mouth before breakfast. 30 tablet 9    linaCLOtide (LINZESS) 145 mcg Cap capsule Take 1 capsule (145 mcg total) by mouth once daily. 30 capsule 9    mupirocin (BACTROBAN) 2 % ointment APPLY EXTERNALLY TO THE AFFECTED AREA THREE TIMES DAILY 22 g 0    mupirocin (BACTROBAN) 2 % ointment APPLY EXTERNALLY TO THE AFFECTED AREA  "THREE TIMES DAILY 22 g 0    nitrofurantoin, macrocrystal-monohydrate, (MACROBID) 100 MG capsule Take 100 mg by mouth 2 (two) times daily.      omeprazole (PRILOSEC) 20 MG capsule TK 1 C PO QD 30 MIN B PAULINE      QUEtiapine (SEROQUEL) 25 MG Tab Take 1 tablet (25 mg total) by mouth once daily. 30 tablet 2    QUEtiapine (SEROQUEL) 50 MG tablet Take 1 tablet (50 mg total) by mouth nightly. 30 tablet 2    sertraline (ZOLOFT) 100 MG tablet Take 1 tablet (100 mg total) by mouth once daily. Note that these tablets are twice the strength of the previous prescription. 30 tablet 0    triamterene-hydrochlorothiazide 37.5-25 mg (DYAZIDE) 37.5-25 mg per capsule Take 1 capsule by mouth every morning. 30 capsule 9     No current facility-administered medications for this visit.        ALLERGIES:  Review of patient's allergies indicates:  No Known Allergies    FAMILY HISTORY:  Family History   Problem Relation Age of Onset    Cancer Mother     Anxiety disorder Daughter     Cancer Daughter     Heart attack Son        SOCIAL HISTORY:  Social History     Tobacco Use    Smoking status: Never Smoker   Substance Use Topics    Alcohol use: Never     Frequency: Never    Drug use: Never       Review of Systems:  12 system review of systems is negative except for the symptoms mentioned in HPI.      Objective:     Vitals:    08/11/20 1414   BP: 112/72   Pulse: 69   Weight: 73.3 kg (161 lb 9.6 oz)   Height: 5' 5" (1.651 m)     General: NAD, well nourished   Eyes: no tearing, discharge, no erythema   ENT: moist mucous membranes of the oral cavity, nares patent    Neck: Supple, full range of motion  Cardiovascular: Warm and well perfused, pulses equal and symmetrical  Lungs: Normal work of breathing, normal chest wall excursions  Skin: No rash, lesions, or breakdown on exposed skin  Psychiatry: Mood and affect are appropriate   Abdomen: soft, non tender, non distended  Extremeties: No cyanosis, clubbing or edema.    Neurological "   MENTAL STATUS: Alert and oriented to person only. Attention and concentration poor. Speech without dysarthria, unable to name or repeat without difficulty. Recent and remote memory poor.  CRANIAL NERVES: Visual fields intact. PERRL. EOMI. Facial sensation intact. Face symmetrical. Hearing grossly intact. Full shoulder shrug bilaterally. Tongue protrudes midline   SENSORY: Sensation is intact to light touch throughout.    MOTOR: Normal bulk and tone. 5/5 deltoid, biceps, triceps, interosseous, hand  bilaterally. 5/5 iliopsoas, knee extension/flexion, foot dorsi/plantarflexion bilaterally.    REFLEXES: Symmetric and 2+ throughout.   CEREBELLAR/COORDINATION/GAIT: Gait steady with normal arm swing and stride length. Finger to nose intact.

## 2020-08-11 NOTE — LETTER
August 11, 2020      Godwin Lo MD  [address]  [facility] [address]           [facility] Neurology  [address]  [facility] [address]  Phone: [phone]  Fax: [phone]          Patient: Alejandrina Bee   MR Number: [id number]   YOB: 1935   Date of Visit: 8/11/2020       Dear Dr. Godwin Lo:    Thank you for referring Alejandrina Bee to me for evaluation. Attached you will find relevant portions of my assessment and plan of care.    If you have questions, please do not hesitate to call me. I look forward to following Alejandrina Bee along with you.    Sincerely,    Balbir Aceves MD    Enclosure  CC:  No Recipients    If you would like to receive this communication electronically, please contact [email or web address] or [phone] to request more information on [employer] access.    For providers and/or their staff who would like to refer a patient to [facility], please contact us through our one-stop-shop provider referral line, [facility] , at [phone].    If you feel you have received this communication in error or would no longer like to receive these types of communications, please e-mail [email or web address]

## 2020-09-04 ENCOUNTER — TELEPHONE (OUTPATIENT)
Dept: PSYCHIATRY | Facility: CLINIC | Age: 85
End: 2020-09-04

## 2020-09-04 NOTE — TELEPHONE ENCOUNTER
Spoke to patient's daughter Jade on the phone. Informed Jade that the psychiatry clinic will have to reschedule the patient's appointment from 12/29/2020 to 12/22/2020. Patient's daughter Jade verbalized understanding.

## 2020-09-10 NOTE — PROGRESS NOTES
"Staff Note:      Pt was interviewed personally by me with resident and family present.   I agree with the Resident's findings.     Due to worsening of psychotic agitation, Seroquel was increased following review of risks including "black box" warning from FDA.  Trazodone was discontinued and Zoloft was left unchanged.    TLK    "

## 2020-10-01 ENCOUNTER — HOSPITAL ENCOUNTER (EMERGENCY)
Facility: HOSPITAL | Age: 85
Discharge: HOME | End: 2020-10-01
Attending: EMERGENCY MEDICINE
Payer: MEDICARE

## 2020-10-01 VITALS
TEMPERATURE: 98 F | DIASTOLIC BLOOD PRESSURE: 70 MMHG | BODY MASS INDEX: 26.63 KG/M2 | WEIGHT: 160 LBS | RESPIRATION RATE: 16 BRPM | SYSTOLIC BLOOD PRESSURE: 138 MMHG | OXYGEN SATURATION: 96 % | HEART RATE: 65 BPM

## 2020-10-01 DIAGNOSIS — T17.908A ASPIRATION INTO AIRWAY, INITIAL ENCOUNTER: ICD-10-CM

## 2020-10-01 DIAGNOSIS — R13.10 DYSPHAGIA: ICD-10-CM

## 2020-10-01 LAB
ALBUMIN SERPL BCP-MCNC: 3.8 G/DL (ref 3.5–5.2)
ALP SERPL-CCNC: 141 U/L (ref 55–135)
ALT SERPL W/O P-5'-P-CCNC: 14 U/L (ref 10–44)
AMORPH CRY UR QL COMP ASSIST: ABNORMAL
ANION GAP SERPL CALC-SCNC: 10 MMOL/L (ref 8–16)
AST SERPL-CCNC: 20 U/L (ref 10–40)
BACTERIA #/AREA URNS AUTO: ABNORMAL /HPF
BASOPHILS # BLD AUTO: 0.05 K/UL (ref 0–0.2)
BASOPHILS NFR BLD: 0.5 % (ref 0–1.9)
BILIRUB SERPL-MCNC: 0.5 MG/DL (ref 0.1–1)
BILIRUB UR QL STRIP: NEGATIVE
BUN SERPL-MCNC: 17 MG/DL (ref 8–23)
CALCIUM SERPL-MCNC: 10.3 MG/DL (ref 8.7–10.5)
CHLORIDE SERPL-SCNC: 104 MMOL/L (ref 95–110)
CLARITY UR REFRACT.AUTO: ABNORMAL
CO2 SERPL-SCNC: 25 MMOL/L (ref 23–29)
COLOR UR AUTO: YELLOW
CREAT SERPL-MCNC: 0.9 MG/DL (ref 0.5–1.4)
CTP QC/QA: YES
DIFFERENTIAL METHOD: ABNORMAL
EOSINOPHIL # BLD AUTO: 0.3 K/UL (ref 0–0.5)
EOSINOPHIL NFR BLD: 2.7 % (ref 0–8)
ERYTHROCYTE [DISTWIDTH] IN BLOOD BY AUTOMATED COUNT: 14.6 % (ref 11.5–14.5)
EST. GFR  (AFRICAN AMERICAN): >60 ML/MIN/1.73 M^2
EST. GFR  (NON AFRICAN AMERICAN): 58.9 ML/MIN/1.73 M^2
GLUCOSE SERPL-MCNC: 89 MG/DL (ref 70–110)
GLUCOSE UR QL STRIP: NEGATIVE
HCT VFR BLD AUTO: 47.9 % (ref 37–48.5)
HGB BLD-MCNC: 15.2 G/DL (ref 12–16)
HGB UR QL STRIP: NEGATIVE
IMM GRANULOCYTES # BLD AUTO: 0.04 K/UL (ref 0–0.04)
IMM GRANULOCYTES NFR BLD AUTO: 0.4 % (ref 0–0.5)
KETONES UR QL STRIP: NEGATIVE
LEUKOCYTE ESTERASE UR QL STRIP: ABNORMAL
LYMPHOCYTES # BLD AUTO: 2.8 K/UL (ref 1–4.8)
LYMPHOCYTES NFR BLD: 26.4 % (ref 18–48)
MCH RBC QN AUTO: 29.3 PG (ref 27–31)
MCHC RBC AUTO-ENTMCNC: 31.7 G/DL (ref 32–36)
MCV RBC AUTO: 92 FL (ref 82–98)
MICROSCOPIC COMMENT: ABNORMAL
MONOCYTES # BLD AUTO: 0.7 K/UL (ref 0.3–1)
MONOCYTES NFR BLD: 6.5 % (ref 4–15)
NEUTROPHILS # BLD AUTO: 6.8 K/UL (ref 1.8–7.7)
NEUTROPHILS NFR BLD: 63.5 % (ref 38–73)
NITRITE UR QL STRIP: NEGATIVE
NRBC BLD-RTO: 0 /100 WBC
PH UR STRIP: 6 [PH] (ref 5–8)
PLATELET # BLD AUTO: 257 K/UL (ref 150–350)
PMV BLD AUTO: 10.5 FL (ref 9.2–12.9)
POTASSIUM SERPL-SCNC: 3.7 MMOL/L (ref 3.5–5.1)
PROT SERPL-MCNC: 6.9 G/DL (ref 6–8.4)
PROT UR QL STRIP: NEGATIVE
RBC # BLD AUTO: 5.19 M/UL (ref 4–5.4)
RBC #/AREA URNS AUTO: 2 /HPF (ref 0–4)
SARS-COV-2 RDRP RESP QL NAA+PROBE: NEGATIVE
SODIUM SERPL-SCNC: 139 MMOL/L (ref 136–145)
SP GR UR STRIP: 1.02 (ref 1–1.03)
TROPONIN I SERPL DL<=0.01 NG/ML-MCNC: 0.01 NG/ML (ref 0–0.03)
URN SPEC COLLECT METH UR: ABNORMAL
WBC # BLD AUTO: 10.74 K/UL (ref 3.9–12.7)
WBC #/AREA URNS AUTO: 10 /HPF (ref 0–5)

## 2020-10-01 PROCEDURE — 80053 COMPREHEN METABOLIC PANEL: CPT

## 2020-10-01 PROCEDURE — 99285 PR EMERGENCY DEPT VISIT,LEVEL V: ICD-10-PCS | Mod: ,,, | Performed by: EMERGENCY MEDICINE

## 2020-10-01 PROCEDURE — U0002 COVID-19 LAB TEST NON-CDC: HCPCS | Performed by: INTERNAL MEDICINE

## 2020-10-01 PROCEDURE — 99285 EMERGENCY DEPT VISIT HI MDM: CPT | Mod: ,,, | Performed by: EMERGENCY MEDICINE

## 2020-10-01 PROCEDURE — 93010 EKG 12-LEAD: ICD-10-PCS | Mod: ,,, | Performed by: INTERNAL MEDICINE

## 2020-10-01 PROCEDURE — 81001 URINALYSIS AUTO W/SCOPE: CPT

## 2020-10-01 PROCEDURE — 99285 EMERGENCY DEPT VISIT HI MDM: CPT | Mod: 25,CS

## 2020-10-01 PROCEDURE — P9612 CATHETERIZE FOR URINE SPEC: HCPCS

## 2020-10-01 PROCEDURE — 85025 COMPLETE CBC W/AUTO DIFF WBC: CPT

## 2020-10-01 PROCEDURE — 93010 ELECTROCARDIOGRAM REPORT: CPT | Mod: ,,, | Performed by: INTERNAL MEDICINE

## 2020-10-01 PROCEDURE — 84484 ASSAY OF TROPONIN QUANT: CPT

## 2020-10-01 PROCEDURE — 93005 ELECTROCARDIOGRAM TRACING: CPT

## 2020-10-01 NOTE — ED TRIAGE NOTES
Pt from home with daughter with concerns of aspiration of water last night. Pt was sipping and started coughing. Daughter wants to make sure she didn't aspirate. Pt breathing equal and non-labored, in no acute distress.

## 2020-10-01 NOTE — ED PROVIDER NOTES
"Encounter Date: 10/1/2020       History     Chief Complaint   Patient presents with    Aspiration     Daughter reports pt "choked" last night while drinking fluids last night.  Also reports diarrhea.  Pt has alzheimers.     Alejandrina Bee is a 84 yo female with PMHx with   Alzheimer disease, HTN, Hypothyroidsism, IBS, lymphoma who presents to the ED with chief complaint of "choking on fluids last night".  Patient has significant Alzheimer disease and is unable to provide a history and cannot reliably answer questions.  Her daughter is at bedside who provides the history.  Her daughter states patient was in normal state of health until last night she was sitting in her recliner drinking water around 10PM.  Daughter noted she started to cough and choke on the fluids.  This episode spontaneously ended.  Today daughter notes that she is having difficulty swallowing  Yet has able to eat and drink. She also notes that her mom has had 3 days of loose stools.      The history is provided by the patient (Daughter at bedside).     Review of patient's allergies indicates:  No Known Allergies  Past Medical History:   Diagnosis Date    Alzheimer disease     Cervical cancer     Hypertension     Hypothyroidism     IBS (irritable bowel syndrome)     IBS (irritable bowel syndrome)     Lymphoma      Past Surgical History:   Procedure Laterality Date    HIP REPLACEMENT ARTHROPLASTY Bilateral     TOTAL KNEE ARTHROPLASTY Bilateral      Family History   Problem Relation Age of Onset    Cancer Mother     Anxiety disorder Daughter     Cancer Daughter     Heart attack Son      Social History     Tobacco Use    Smoking status: Never Smoker   Substance Use Topics    Alcohol use: Never     Frequency: Never    Drug use: Never     Review of Systems   Unable to perform ROS: Dementia       Physical Exam     Initial Vitals [10/01/20 1615]   BP Pulse Resp Temp SpO2   (!) 143/72 64 20 97.7 °F (36.5 °C) (!) 94 %      MAP       --     "     Physical Exam    Nursing note and vitals reviewed.  Constitutional: She appears well-developed and well-nourished. She is not diaphoretic. No distress.   HENT:   Head: Normocephalic and atraumatic.   Nose: Nose normal.   Mouth/Throat: Oropharynx is clear and moist. No oropharyngeal exudate.   Eyes: Conjunctivae and EOM are normal. Pupils are equal, round, and reactive to light. Right eye exhibits no discharge. Left eye exhibits no discharge. No scleral icterus.   Neck: Normal range of motion. Neck supple. No tracheal deviation present. No JVD present.   Cardiovascular: Normal rate, regular rhythm, normal heart sounds and intact distal pulses. Exam reveals no gallop and no friction rub.    No murmur heard.  Pulmonary/Chest: Breath sounds normal. No respiratory distress. She has no wheezes. She has no rhonchi. She has no rales. She exhibits no tenderness.   Abdominal: Soft. Bowel sounds are normal. She exhibits no distension and no mass. There is no abdominal tenderness. There is no rebound and no guarding.   Musculoskeletal: Normal range of motion. No tenderness or edema.   Neurological: She is alert and oriented to person, place, and time. She has normal strength. No cranial nerve deficit or sensory deficit.   Gait normal   Skin: Skin is warm and dry. Capillary refill takes less than 2 seconds. No abscess noted. No erythema. No pallor.   Psychiatric: She has a normal mood and affect. Her behavior is normal.         ED Course   Procedures  Labs Reviewed   CBC W/ AUTO DIFFERENTIAL - Abnormal; Notable for the following components:       Result Value    Mean Corpuscular Hemoglobin Conc 31.7 (*)     RDW 14.6 (*)     All other components within normal limits   URINALYSIS, REFLEX TO URINE CULTURE - Abnormal; Notable for the following components:    Appearance, UA Hazy (*)     Leukocytes, UA Trace (*)     All other components within normal limits    Narrative:     Specimen Source->Urine   COMPREHENSIVE METABOLIC PANEL -  Abnormal; Notable for the following components:    Alkaline Phosphatase 141 (*)     eGFR if non  58.9 (*)     All other components within normal limits   URINALYSIS MICROSCOPIC - Abnormal; Notable for the following components:    WBC, UA 10 (*)     All other components within normal limits    Narrative:     Specimen Source->Urine   TROPONIN I   SARS-COV-2 RDRP GENE     EKG Readings: (Independently Interpreted)   Initial Reading: No STEMI.    EKG on October 1,2020 sinus bradycardia with vent rate of 58 beats per minute.  Left bundle branch noted.  No previous EKGs available.  No ST segment elevation or depression. No STEMI.              Imaging Results          CT Head Without Contrast (Final result)  Result time 10/01/20 19:43:20    Final result by Brien Delacruz MD (10/01/20 19:43:20)                 Impression:      No acute intracranial findings.  No hemorrhage or major vascular distribution infarct.  Additional evaluation, as clinically warranted.    Mild generalized cerebral volume loss and chronic microvascular ischemic changes.    Electronically signed by resident: Mohamud Pablo  Date:    10/01/2020  Time:    19:25    Electronically signed by: Brien Delacruz MD  Date:    10/01/2020  Time:    19:43             Narrative:    EXAMINATION:  CT HEAD WITHOUT CONTRAST    CLINICAL HISTORY:  Altered mental status;    TECHNIQUE:  Low dose axial images were obtained through the head.  Coronal and sagittal reformations were also performed. Contrast was not administered.    COMPARISON:  None.    FINDINGS:  Exam is mildly degraded due to patient motion.    Brain parenchyma demonstrates mild generalized cerebral volume loss and patchy hypoattenuation throughout the supratentorial white matter most consistent with chronic microvascular ischemic changes.  There are calcifications seen in the bilateral basal ganglia.  No hemorrhage, mass, edema, or major vascular distribution infarct.    The ventricles and sulci  appear within normal limits noting compensatory enlargement in the setting of generalized cerebral volume loss.  There is no dense vessel sign.  No definite hydrocephalus.  No extra-axial blood or fluid collections.    Cranium is intact.  The visualized paranasal sinuses and mastoid air cells are essentially clear.  There are postoperative changes in the orbits.                               X-Ray Chest PA And Lateral (Final result)  Result time 10/01/20 18:27:42    Final result by Vishnu Thibodeaux MD (10/01/20 18:27:42)                 Impression:      1. No acute cardiopulmonary process.      Electronically signed by: Vishnu Thibodeaux MD  Date:    10/01/2020  Time:    18:27             Narrative:    EXAMINATION:  XR CHEST PA AND LATERAL    CLINICAL HISTORY:  Unspecified foreign body in respiratory tract, part unspecified causing other injury, initial encounter    TECHNIQUE:  PA and lateral views of the chest were performed.    COMPARISON:  None    FINDINGS:  The cardiomediastinal silhouette is prominent noting some magnification by technique.  There is atherosclerotic calcification of the aorta.  Electronic device projects over the lower thorax..  There is no pleural effusion.  The trachea is midline.  The lungs are symmetrically expanded bilaterally with minimally coarse interstitial attenuation.  There is left basilar subsegmental atelectasis.  No large focal consolidation seen.  There is no pneumothorax.  The osseous structures are remarkable for degenerative change.  Surgical change overlies the right upper quadrant..                                 Medical Decision Making:   Initial Assessment:     85-year-old female past medical history of Alzheimer's disease, hypertension who presents the ED with chief complaint of brief resolved choking episode.  Daughters with patient and provides history as patient is unable to.  Daughter notes concern for swallowing studies.  Patient had a swallow study performed a  month ago with recommendations of pureed to mechanical soft consistency food with thin liquids for common aspiration precautions on arrival to the ED patient is hemodynamically stable, afebrile.  Differential Diagnosis:   Chronic moderate oropharyngeal dysphagia 2/2  Alzheimer disease verses doubt aspiration pneumonia versus doubt UTI   Independently Interpreted Test(s):   I have ordered and independently interpreted X-rays - see summary below.       <> Summary of X-Ray Reading(s): CXR with no acute Cardiopulmonary abnormalities  I have ordered and independently interpreted EKG Reading(s) - see prior notes  Clinical Tests:   Lab Tests: Ordered and Reviewed  ED Management:   Patient was closely evaluated in the ED. Her lab work came back nonrevealing.  Noncontrast CT head returned with no acute intracranial abnormalities.  After benign workup patient was p.o. challenged successfully.  She was reassessed and had no complaints of chest pain, abdominal pain, nausea or vomiting.  After talking to the daughter she stated that the diarrhea was consistent with her IBS and that she would follow-up with her PCP.    Daughter/Patient were given strict return precautions such as chest pain, abdominal with cane, inability to tolerate p.o. fluids, choking, shortness of breath, unexplained fever.  Daughter voiced verbal understanding and agreement of plan, he should have her mom evaluated tomorrow by her PCP and return to the ED if there are any concerning issues.    10:37 PM Called daughter and let her know about abnormal EKG findings.  I recommended that she follow-up with her PCP tomorrow.  Daughter stated that she would have the patient follow-up with her PCP, Dr. Younger.  I also recommended that if the patient had any complaints of chest pain she be emergently brought back to the ED.  Daughter voiced verbal understanding and agreement stating that if there is any concerns she would return to the ED to be evaluated.                              Clinical Impression:     ICD-10-CM ICD-9-CM   1. Aspiration into airway, initial encounter  T17.908A 934.9   2. Dysphagia  R13.10 787.20                      Disposition:   Disposition: Discharged  Condition: Stable     ED Disposition Condition    Discharge Stable        ED Prescriptions     None        Follow-up Information     Follow up With Specialties Details Why Contact Info    Godwin Lo MD Internal Medicine In 2 days  [address] [facility] [address]  [phone]                                         Chelo Mckeon MD  Resident  10/01/20 [id number]

## 2020-10-02 ENCOUNTER — TELEPHONE (OUTPATIENT)
Dept: NEUROLOGY | Facility: CLINIC | Age: 85
End: 2020-10-02

## 2020-10-02 DIAGNOSIS — F02.80 LATE ONSET ALZHEIMER'S DISEASE WITHOUT BEHAVIORAL DISTURBANCE: Primary | ICD-10-CM

## 2020-10-02 DIAGNOSIS — G30.1 LATE ONSET ALZHEIMER'S DISEASE WITHOUT BEHAVIORAL DISTURBANCE: Primary | ICD-10-CM

## 2020-10-02 NOTE — TELEPHONE ENCOUNTER
Spoke with daughter, she understands what hospice is about and would like to proceed with hospice and not prepare for advance care goals.

## 2020-10-02 NOTE — DISCHARGE INSTRUCTIONS
You were seen in the emergency department for a episode of us choking the night before.  Your blood work, head imaging and chest x-ray returned normal.       Thank you for coming to our Emergency Department today. It is important to remember that some problems are difficult to diagnose and may not be found during your first visit. Be sure to follow up with your primary care doctor and review any labs/imaging that was performed with them. If you do not have a primary care doctor, you may contact the one listed on your discharge paperwork or you may also call the [facility] Appointment Desk at [phone] to schedule an appointment with one.     All medications may potentially have side effects and it is impossible to predict which medications may give you side effects. If you feel that you are having a negative effect of any medication you should immediately stop taking them and seek medical attention.    Return to the ER with any questions/concerns, new/concerning symptoms, worsening or failure to improve. Do not drive or make any important decisions for 24 hours if you have received any pain medications, sedatives or mood altering drugs during your ER visit.

## 2020-10-02 NOTE — TELEPHONE ENCOUNTER
----- Message from Carolina Gimenez sent at 10/2/2020 11:57 AM CDT -----  Regarding: pt advice  Contact: Salima (dtr) @  [phone]  Calling to speak with Dr. Aceves regarding hospice for the patient. Please call.